# Patient Record
Sex: MALE | Race: WHITE | NOT HISPANIC OR LATINO | Employment: FULL TIME | URBAN - NONMETROPOLITAN AREA
[De-identification: names, ages, dates, MRNs, and addresses within clinical notes are randomized per-mention and may not be internally consistent; named-entity substitution may affect disease eponyms.]

---

## 2020-02-14 ENCOUNTER — OFFICE VISIT (OUTPATIENT)
Dept: FAMILY MEDICINE CLINIC | Facility: CLINIC | Age: 55
End: 2020-02-14

## 2020-02-14 VITALS
HEART RATE: 97 BPM | WEIGHT: 208.8 LBS | DIASTOLIC BLOOD PRESSURE: 62 MMHG | SYSTOLIC BLOOD PRESSURE: 120 MMHG | HEIGHT: 74 IN | BODY MASS INDEX: 26.8 KG/M2 | OXYGEN SATURATION: 98 %

## 2020-02-14 DIAGNOSIS — E11.65 TYPE 2 DIABETES MELLITUS WITH HYPERGLYCEMIA, WITHOUT LONG-TERM CURRENT USE OF INSULIN (HCC): ICD-10-CM

## 2020-02-14 DIAGNOSIS — Z76.89 ENCOUNTER TO ESTABLISH CARE: ICD-10-CM

## 2020-02-14 DIAGNOSIS — I48.91 ATRIAL FIBRILLATION, UNSPECIFIED TYPE (HCC): Primary | ICD-10-CM

## 2020-02-14 DIAGNOSIS — I21.4 NSTEMI (NON-ST ELEVATION MYOCARDIAL INFARCTION) (HCC): ICD-10-CM

## 2020-02-14 PROCEDURE — 99204 OFFICE O/P NEW MOD 45 MIN: CPT | Performed by: FAMILY MEDICINE

## 2020-02-14 RX ORDER — INSULIN GLARGINE 100 [IU]/ML
17 INJECTION, SOLUTION SUBCUTANEOUS DAILY
COMMUNITY
End: 2020-03-06

## 2020-02-15 NOTE — PROGRESS NOTES
Subjective   Chief Complaint   Patient presents with   • Establish Care   • Diabetes     Francis Whittaker is a 54 y.o. year old presenting to [facility] as new patient.      Additional Concerns:    Patient recently hospitalized from 2/9/2020 - 2/11/2020 after syncopal episode while working as a .   He was found to be in afib with RVR at admission, and noted to have an NSTEMI with elevated cardiac enzymes.  Cardioversion used to convert patient back to sinus rhythm.  He was discharged on metoprolol and Eliquis for this.  For the NSTEMI, patient will follow up with cardiology and cath will be scheduled once cardiac enzymes return to normal.    During hospitalization, patient was noted to have hyperglycemia.  New diagnosis of diabetes made.  He was started on insulin therapy.  Also had consultation with the diabetes educator.  Patient has been monitoring blood glucose 4 times daily since discharge.  Notes that it is normally in the low 200s, which is supported by the log he brings with him today.  His fasting range is between 210-227.  Slightly higher with readings before lunch, before dinner and at bedtime.  He is trying to eat around 60 g carbs/meal.  Wife counts her carbs, as she is non-insulin dependent diabetic.  His current insulin regimen is Lantus 15 units at bedtime and Humalog 5 units three times daily with meals.      Past Medical History:  Past Medical History:   Diagnosis Date   • Atrial fibrillation (CMS/HCC)    • Diabetes mellitus (CMS/HCC)        Past Surgical History:  No past surgical history on file.    Medications:  Current Outpatient Medications on File Prior to Visit   Medication Sig Dispense Refill   • apixaban (ELIQUIS) 5 MG tablet tablet Take 5 mg by mouth 2 (Two) Times a Day.     • insulin glargine (LANTUS) 100 UNIT/ML injection Inject 17 Units under the skin into the appropriate area as directed Daily.     • insulin lispro (humaLOG) 100 UNIT/ML injection Inject 5 Units under  "the skin into the appropriate area as directed 3 (Three) Times a Day Before Meals.     • metoprolol tartrate (LOPRESSOR) 25 MG tablet Take 12.5 mg by mouth 2 (Two) Times a Day.       No current facility-administered medications on file prior to visit.        Allergies:  Allergies no known allergies    Family History:  Family History   Problem Relation Age of Onset   • Aneurysm Father    • Diabetes Other    • Heart disease Other    • Hypertension Other        Social History:  Social History     Socioeconomic History   • Marital status:      Spouse name: Not on file   • Number of children: Not on file   • Years of education: Not on file   • Highest education level: Not on file   Tobacco Use   • Smoking status: Former Smoker     Years: 5.00   Substance and Sexual Activity   • Alcohol use: Not Currently     Health Maintenance   Topic Date Due   • TDAP/TD VACCINES (1 - Tdap) 04/15/1977   • ZOSTER VACCINE (1 of 2) 04/15/2016   • INFLUENZA VACCINE  08/01/2019   • COLONOSCOPY  02/14/2020       Problem list was reviewed and updated as appropriate.      Review of Systems   Constitutional: Negative for appetite change and unexpected weight change.   HENT: Negative for voice change.    Eyes: Negative for visual disturbance.   Respiratory: Negative for chest tightness and shortness of breath.    Cardiovascular: Negative for chest pain and leg swelling.   Gastrointestinal: Negative for abdominal pain, constipation and diarrhea.   Endocrine: Negative for cold intolerance and heat intolerance.   Genitourinary: Negative for difficulty urinating.   Musculoskeletal: Negative for back pain and myalgias.   Skin: Negative for rash.   Neurological: Negative for numbness and headaches.   Psychiatric/Behavioral: Negative for dysphoric mood and sleep disturbance.         Objective     /62 (BP Location: Left arm)   Pulse 97   Ht 188 cm (74\")   Wt 94.7 kg (208 lb 12.8 oz)   SpO2 98%   BMI 26.81 kg/m²   Physical Exam "   Constitutional: He is oriented to person, place, and time. He appears well-developed and well-nourished. No distress.   HENT:   Head: Normocephalic and atraumatic.   Right Ear: Tympanic membrane and ear canal normal.   Nose: Nose normal.   Mouth/Throat: Oropharynx is clear and moist.   Left ear cerumen impaction, unable to visualize the TM   Eyes: Pupils are equal, round, and reactive to light. Conjunctivae and EOM are normal.   Neck: Normal range of motion. Neck supple. No thyromegaly present.   Cardiovascular: Normal rate and regular rhythm.   Murmur heard.   Systolic murmur is present with a grade of 2/6.  Pulmonary/Chest: Effort normal and breath sounds normal. No respiratory distress. He has no wheezes. He has no rales.   Abdominal: Soft. Bowel sounds are normal. He exhibits no distension. There is no tenderness.   Musculoskeletal: Normal range of motion. He exhibits no edema or tenderness.   Lymphadenopathy:     He has no cervical adenopathy.   Neurological: He is alert and oriented to person, place, and time.   Skin: Skin is warm and dry. No rash noted.   Psychiatric: He has a normal mood and affect.         Lab Review   CBC, Hepatic function panel, CMP, PT/INR, TSH and HgbA1c from 2/9/2020 reviewed.     Imaging  None reviewed.         Assessment/Plan   Francis was seen today for establish care and diabetes.    Diagnoses and all orders for this visit:    Atrial fibrillation, unspecified type (CMS/HCC)  Patient in normal sinus rhythm today.  Should continue taking metoprolol and eliquis.  Has upcoming appointment with cardiology scheduled.  Patient is not having any symptoms at this time.    Type 2 diabetes mellitus with hyperglycemia, without long-term current use of insulin (CMS/HCC)  New diagnosis of diabetes.  Not well controlled yet.  HgbA1c was 11.4%.  Currently taking Lantus 15 units at bedtime and Humalog 5 units TID with meals.  Discussed that ways that different insulins work, and patient and his  wife voiced understanding.  Given that his fasting glucose is still elevated, will increase the Lantus to 17 units daily.  Should continue Humalog 5 units at this time.  Will increase conservatively at this time, as patient and his wife still have some uneasiness about using this medication.  Advised that if at any time during these dose adjustments his glucose is < 80, he should revert back to previous dosing and call.  He will continue to keep blood glucose log, checking 4 times daily.  He is watching his diet and practicing carb counting, which should make adjusting mealtime insulin easier.  Will monitor closely over the next few weeks.    NSTEMI (non-ST elevation myocardial infarction) (CMS/[facility])  Following with cardiology at this time.  Per patient, plan for heart cath after cardiac enzymes return to normal.  He should continue on metoprolol at this time.  Will need to consider addition of statin in the future as long as liver transaminases remain normal.    Encounter to establish care  Health maintenance items reviewed.  Recommendations for screening and immunizations discussed.  Patient has not been to a physician in many years.  Will plan to discuss further at future visits.            This document has been electronically signed by Caryn Titus MD on February 14, 2020 7:32 PM

## 2020-02-17 ENCOUNTER — TELEPHONE (OUTPATIENT)
Dept: FAMILY MEDICINE CLINIC | Facility: CLINIC | Age: 55
End: 2020-02-17

## 2020-02-17 NOTE — TELEPHONE ENCOUNTER
WIFE CALLED AND STATES THAT HE WAS EXPOSED TO THE FLU BUT HE WAS WEARING A MASK. SHE NEEDS TO KNOW WHAT ARE HIS CHANCES IF GETTING SICK AND WHAT THEY CAN DO TO KEEP FROM GETTING. CALL PATTI [phone]

## 2020-02-17 NOTE — TELEPHONE ENCOUNTER
Called and spoke to patient.  He notes that he was around his grandson one day, and the next day he was diagnosed with the flu.  He was wearing his mask.  Uncertain if washing his hands good.  He has another granddaughter with runny nose, but no flu symptoms.  Advised that patient should continue taking extra precautions to stay well, including his mask.  Should wash his hands frequently and avoid exposure to known sick individuals.  Discussed common flu symptoms, and advised that patient be seen right away for any of these.  He voiced understanding.  Arnaldo Titus

## 2020-02-21 ENCOUNTER — OFFICE VISIT (OUTPATIENT)
Dept: FAMILY MEDICINE CLINIC | Facility: CLINIC | Age: 55
End: 2020-02-21

## 2020-02-21 VITALS
WEIGHT: 210.9 LBS | SYSTOLIC BLOOD PRESSURE: 128 MMHG | HEART RATE: 97 BPM | HEIGHT: 74 IN | DIASTOLIC BLOOD PRESSURE: 82 MMHG | OXYGEN SATURATION: 99 % | BODY MASS INDEX: 27.07 KG/M2

## 2020-02-21 DIAGNOSIS — I48.91 ATRIAL FIBRILLATION, UNSPECIFIED TYPE (HCC): ICD-10-CM

## 2020-02-21 DIAGNOSIS — E11.65 TYPE 2 DIABETES MELLITUS WITH HYPERGLYCEMIA, WITHOUT LONG-TERM CURRENT USE OF INSULIN (HCC): Primary | ICD-10-CM

## 2020-02-21 LAB — GLUCOSE BLDC GLUCOMTR-MCNC: 174 MG/DL (ref 70–130)

## 2020-02-21 PROCEDURE — 82962 GLUCOSE BLOOD TEST: CPT | Performed by: FAMILY MEDICINE

## 2020-02-21 PROCEDURE — 99213 OFFICE O/P EST LOW 20 MIN: CPT | Performed by: FAMILY MEDICINE

## 2020-02-23 PROBLEM — I48.91 ATRIAL FIBRILLATION: Status: ACTIVE | Noted: 2020-02-23

## 2020-02-23 PROBLEM — E11.65 TYPE 2 DIABETES MELLITUS WITH HYPERGLYCEMIA, WITHOUT LONG-TERM CURRENT USE OF INSULIN: Status: ACTIVE | Noted: 2020-02-23

## 2020-02-24 NOTE — PROGRESS NOTES
Follow Up Office Visit          Francis Whittaker is a 54 y.o. male that presents today for   Chief Complaint   Patient presents with   • Follow-up   • Atrial Fibrillation   • Diabetes       Atrial Fibrillation   Presents for follow-up visit. Symptoms are negative for chest pain, dizziness, hypotension, palpitations, shortness of breath, syncope, tachycardia and weakness. The symptoms have been stable (Patient taking metoprolol without adverse effects). Past medical history includes atrial fibrillation.   Diabetes   He presents for his follow-up diabetic visit. He has type 2 diabetes mellitus. The initial diagnosis of diabetes was made 1 month ago. His disease course has been improving. Pertinent negatives for hypoglycemia include no confusion, dizziness or headaches. Pertinent negatives for diabetes include no blurred vision, no chest pain, no fatigue, no foot paresthesias, no visual change, no weakness and no weight loss. Symptoms are improving. There are no diabetic complications. Current diabetic treatment includes diet and insulin injections. He is compliant with treatment all of the time. He is currently taking insulin pre-breakfast, pre-lunch, pre-dinner and at bedtime. His weight is stable. He is following a diabetic (Counting Carbs, Eats about 60 carbs/meal) diet. He participates in exercise intermittently (short walks). His home blood glucose trend is decreasing steadily. (Breakfast 186-216, Lunch 138-260, Dinner 158-292, Bedtime 164-225)       The following portions of the patient's history were reviewed and updated as appropriate: allergies, current medications, past medical history, past surgical history and problem list.    Review of Systems   Constitutional: Negative for chills, fatigue and weight loss.   HENT: Negative for congestion and sore throat.    Eyes: Negative for blurred vision.   Respiratory: Negative for cough and shortness of breath.    Cardiovascular: Negative for chest pain, palpitations,  "leg swelling and syncope.   Gastrointestinal: Negative for abdominal pain, diarrhea, nausea and vomiting.   Genitourinary: Negative for difficulty urinating.   Musculoskeletal: Negative for back pain and myalgias.   Skin: Negative for rash.   Neurological: Negative for dizziness, weakness, numbness and headaches.   Psychiatric/Behavioral: Negative for confusion, dysphoric mood and sleep disturbance.           Vitals:    02/21/20 1354   BP: 128/82   BP Location: Left arm   Pulse: 97   SpO2: 99%   Weight: 95.7 kg (210 lb 14.4 oz)   Height: 188 cm (74\")     Body mass index is 27.08 kg/m².    Physical Exam   Constitutional: He is oriented to person, place, and time. He appears well-developed and well-nourished. No distress.   HENT:   Head: Normocephalic and atraumatic.   Mouth/Throat: Oropharynx is clear and moist.   Eyes: EOM are normal.   Neck: Neck supple.   Cardiovascular: Normal rate and regular rhythm.   No murmur heard.  Pulmonary/Chest: Effort normal and breath sounds normal. No respiratory distress. He has no wheezes.   Abdominal: Soft. There is no tenderness.   Musculoskeletal: He exhibits no edema.   Neurological: He is alert and oriented to person, place, and time.   Skin: Skin is warm and dry. No rash noted.   Psychiatric: He has a normal mood and affect.   Vitals reviewed.      Assessment/Plan       Francis was seen today for follow-up, atrial fibrillation and diabetes.    Diagnoses and all orders for this visit:    Type 2 diabetes mellitus with hyperglycemia, without long-term current use of insulin (CMS/[facility])  Patient's current insulin regimen includes Lantus 17 units at bedtime, and Humalog 5 units 3 times daily with meals.  Blood glucose levels are still consistently high.  Some inconsistencies between these.  Glucose readings with patient in the office.  Blood glucose checked with office meter, patient's glucometer and his wife's glucometer.  Patient and his wife's glucose meters read between 210-220 " when patient's glucose was checked.  Office meter reading was 174, which is 50 points below patient's meter.  Patient is uncomfortable making insulin adjustments until he is sure his meter is accurate.  Encouraged that patient have his glucose meter calibrated to be on the safe side, then call when this has been done and medication adjustments could be made if needed.  Should continue carbohydrate counting.  Will plan to get urine microalbumin and diabetic foot exam at next visit.  -     POCT Glucose    Atrial fibrillation, unspecified type (CMS/HCC)  Rate controlled at this time with metoprolol  Normal sinus rhythm at office visit today  Patient following with cardiology  Plan for cardiac catheterization in a couple of weeks  Patient is anxious about this exam            This document has been electronically signed by Caryn Titus MD

## 2020-03-05 ENCOUNTER — TELEPHONE (OUTPATIENT)
Dept: FAMILY MEDICINE CLINIC | Facility: CLINIC | Age: 55
End: 2020-03-05

## 2020-03-05 NOTE — TELEPHONE ENCOUNTER
Francis's wife is needing to speak with you regarding Francis's Insulin.  She thinks they would do better with something other than the needles.

## 2020-03-06 RX ORDER — INSULIN LISPRO 100 [IU]/ML
5 INJECTION, SOLUTION INTRAVENOUS; SUBCUTANEOUS
Qty: 4.5 ML | Refills: 2 | Status: SHIPPED | OUTPATIENT
Start: 2020-03-06 | End: 2020-05-20 | Stop reason: SDUPTHER

## 2020-03-06 NOTE — TELEPHONE ENCOUNTER
Called and spoke with patient's wife who notes that he need insulin refilled.  Had discussed changing from vials to pens at last visit due to visual problems with patient and his wife seeing the syringe.  Lantus and Humalog pens sent to the pharmacy.  Patient will call tonight to make sure they are able to pick these up.  Will plan to contact patient tomorrow to ensure he was able to get them and insurance didn't deny these medications.      Wife reports patient just left the office after his heart cath.  Found severe aortic stenosis and cardiomyopathy per patient's wife.   Plans to have replacement of aortic valve, consultation set up in [place], [place].       MARIELA [facility]

## 2020-03-07 ENCOUNTER — TELEPHONE (OUTPATIENT)
Dept: FAMILY MEDICINE CLINIC | Facility: CLINIC | Age: 55
End: 2020-03-07

## 2020-03-07 NOTE — TELEPHONE ENCOUNTER
Spoke with pharmacy.  Patient's Humalog is covered by insurance, however pens will not be available until Monday.  If patient needs before, prescription can be transferred to another pharmacy.  Lantus was changed to Basaglar per insurance, and this is available at pharmacy.  Pen needle prescription sent as well.  Spoke to patient's wife about these medications, and she voiced understanding.  Will call Monday with any additional questions/concerns.  Arnaldo Titus

## 2020-03-09 ENCOUNTER — TELEPHONE (OUTPATIENT)
Dept: FAMILY MEDICINE CLINIC | Facility: CLINIC | Age: 55
End: 2020-03-09

## 2020-03-09 NOTE — TELEPHONE ENCOUNTER
WIFE CALLED AND STATES HE NEEDS REFILLS SENT TO [facility]  metoprolol tartrate (LOPRESSOR) 25 MG tablet  apixaban (ELIQUIS) 5 MG tablet tablet

## 2020-03-16 ENCOUNTER — OFFICE VISIT (OUTPATIENT)
Dept: FAMILY MEDICINE CLINIC | Facility: CLINIC | Age: 55
End: 2020-03-16

## 2020-03-16 VITALS
HEIGHT: 74 IN | WEIGHT: 210 LBS | DIASTOLIC BLOOD PRESSURE: 88 MMHG | HEART RATE: 88 BPM | BODY MASS INDEX: 26.95 KG/M2 | OXYGEN SATURATION: 94 % | SYSTOLIC BLOOD PRESSURE: 126 MMHG

## 2020-03-16 DIAGNOSIS — E11.65 TYPE 2 DIABETES MELLITUS WITH HYPERGLYCEMIA, WITHOUT LONG-TERM CURRENT USE OF INSULIN (HCC): Primary | ICD-10-CM

## 2020-03-16 DIAGNOSIS — I35.0 AORTIC VALVE STENOSIS, ETIOLOGY OF CARDIAC VALVE DISEASE UNSPECIFIED: ICD-10-CM

## 2020-03-16 PROCEDURE — 99213 OFFICE O/P EST LOW 20 MIN: CPT | Performed by: FAMILY MEDICINE

## 2020-03-16 RX ORDER — BLOOD SUGAR DIAGNOSTIC
STRIP MISCELLANEOUS
COMMUNITY
Start: 2020-02-23 | End: 2020-03-16 | Stop reason: SDUPTHER

## 2020-03-16 RX ORDER — PEN NEEDLE, DIABETIC 30 GX3/16"
1 NEEDLE, DISPOSABLE MISCELLANEOUS
Qty: 100 EACH | Refills: 2 | Status: SHIPPED | OUTPATIENT
Start: 2020-03-16 | End: 2020-04-03 | Stop reason: SDUPTHER

## 2020-03-16 NOTE — PATIENT INSTRUCTIONS
Increase Basaglar (night time) to 19 units at bedtime    If in 1 week, Blood sugars are consistently > 160 during the day  Increase Humalog (day time) to 6 units at bedtime

## 2020-03-16 NOTE — PROGRESS NOTES
Follow Up Office Visit          Francis Whittaker is a 54 y.o. male that presents today for   Chief Complaint   Patient presents with   • Diabetes   • Atrial Fibrillation       HPI    Patient presents today for diabetes follow-up.  Notes that diabetes is doing okay.  He is having to urinate a lot less since getting his diabetes under control.  He is currently taking Basaglar 17 units at bedtime.  He is taking Humalog 5 units 3 times daily with meals.  Brings blood glucose log with him today.  Most blood sugars are somewhere in the range of 150-190.  No blood glucose levels less than 100.  Rarely a blood glucose level over 200.  Patient now has quick pens that allow him to more easily administer insulin himself.  Requesting refill of testing strips and insulin pen needles today.     Patient notes that cardiac catheterization found significant aortic valve stenosis.  Patient is being referred to a provider in [place] to discuss noninvasive options for correction/replacement.  He has that appointment scheduled at the beginning of next month.  Patient is following with local cardiologist in [place].    The following portions of the patient's history were reviewed and updated as appropriate: allergies, current medications, past medical history and problem list.    Review of Systems   Constitutional: Negative for chills and fatigue.   HENT: Negative for congestion and sore throat.    Respiratory: Negative for cough and shortness of breath.    Cardiovascular: Negative for chest pain and leg swelling.   Gastrointestinal: Negative for abdominal pain, diarrhea, nausea and vomiting.   Genitourinary: Negative for difficulty urinating.   Musculoskeletal: Negative for back pain and myalgias.   Skin: Negative for rash.   Neurological: Negative for numbness and headaches.   Psychiatric/Behavioral: Negative for dysphoric mood and sleep disturbance.           Vitals:    03/16/20 1325   BP: 126/88   Pulse: 88   SpO2: 94%   Weight:  "95.3 kg (210 lb)   Height: 188 cm (74\")     Body mass index is 26.96 kg/m².    Physical Exam   Constitutional: He is oriented to person, place, and time. He appears well-developed and well-nourished. No distress.   HENT:   Head: Normocephalic and atraumatic.   Mouth/Throat: Oropharynx is clear and moist.   Eyes: EOM are normal.   Neck: Neck supple.   Cardiovascular: Normal rate and regular rhythm.   No murmur heard.  Pulmonary/Chest: Effort normal and breath sounds normal. No respiratory distress. He has no wheezes.   Abdominal: Soft. There is no tenderness.   Musculoskeletal: He exhibits no edema.   Neurological: He is alert and oriented to person, place, and time.   Skin: Skin is warm and dry. No rash noted.   Psychiatric: He has a normal mood and affect.   Vitals reviewed.      Assessment/Plan       Francis was seen today for diabetes and atrial fibrillation.    Diagnoses and all orders for this visit:    Type 2 diabetes mellitus with hyperglycemia, without long-term current use of insulin (CMS/[facility])  Diabetes has shown significant improvement over the last month  Patient should continue checking blood glucose level 4 times daily  Patient should increase Basaglar to 19 units at bedtime  If blood glucose levels during the day are still greater than 160 after 1 week, patient should increase Humalog to 6 units 3 times daily with meals  If any blood glucose levels less than 100, patient should call and go back to previous dosing  Insulin pen needles and test strips refilled today  We will follow-up with phone call in 1 to 2 weeks  Next appointment in 3 months  Hemoglobin A1c ordered to be done prior to this visit.  -     Insulin Glargine (LANTUS SOLOSTAR) 100 UNIT/ML injection pen; Inject 19 Units under the skin into the appropriate area as directed Every Night for 90 days.  -     glucose blood (ACCU-CHEK GUIDE) test strip; 1 each by Other route 4 (Four) Times a Day Before Meals & at Bedtime. Use as instructed  -     " Insulin Pen Needle (PEN NEEDLES) 32G X 4 MM misc; 1 each 4 (Four) Times a Day Before Meals & at Bedtime.  -     Hemoglobin A1c; Future              This document has been electronically signed by Caryn Titus MD on March 16, 2020 13:37

## 2020-03-17 RX ORDER — INSULIN GLARGINE 100 [IU]/ML
19 INJECTION, SOLUTION SUBCUTANEOUS NIGHTLY
Qty: 2 PEN | Refills: 2 | Status: SHIPPED | OUTPATIENT
Start: 2020-03-17 | End: 2020-03-27 | Stop reason: SDUPTHER

## 2020-03-27 ENCOUNTER — TELEPHONE (OUTPATIENT)
Dept: FAMILY MEDICINE CLINIC | Facility: CLINIC | Age: 55
End: 2020-03-27

## 2020-03-27 DIAGNOSIS — E11.65 TYPE 2 DIABETES MELLITUS WITH HYPERGLYCEMIA, WITHOUT LONG-TERM CURRENT USE OF INSULIN (HCC): ICD-10-CM

## 2020-03-27 RX ORDER — INSULIN GLARGINE 100 [IU]/ML
19 INJECTION, SOLUTION SUBCUTANEOUS NIGHTLY
Qty: 2 PEN | Refills: 2 | Status: SHIPPED | OUTPATIENT
Start: 2020-03-27 | End: 2020-06-16

## 2020-03-27 RX ORDER — BLOOD SUGAR DIAGNOSTIC
STRIP MISCELLANEOUS
Qty: 100 EACH | Refills: 0 | Status: SHIPPED | OUTPATIENT
Start: 2020-03-27 | End: 2020-03-27 | Stop reason: SDUPTHER

## 2020-04-03 ENCOUNTER — TELEPHONE (OUTPATIENT)
Dept: FAMILY MEDICINE CLINIC | Facility: CLINIC | Age: 55
End: 2020-04-03

## 2020-04-03 DIAGNOSIS — E11.65 TYPE 2 DIABETES MELLITUS WITH HYPERGLYCEMIA, WITHOUT LONG-TERM CURRENT USE OF INSULIN (HCC): ICD-10-CM

## 2020-04-03 RX ORDER — PEN NEEDLE, DIABETIC 30 GX3/16"
1 NEEDLE, DISPOSABLE MISCELLANEOUS
Qty: 100 EACH | Refills: 2 | Status: SHIPPED | OUTPATIENT
Start: 2020-04-03 | End: 2020-06-16 | Stop reason: SDUPTHER

## 2020-04-03 NOTE — TELEPHONE ENCOUNTER
ALSO WIFE WANTS TO KNOW IF SHE RECOMMENDS HIM TO TAKE VITAMIN C. AND IF SO WHAT KIND OR CAN HE GET A RX?

## 2020-04-03 NOTE — TELEPHONE ENCOUNTER
Please let patient know that there is not currently any evidence to show benefit for the use of vitamin C in respect to the prevention of Coronavirus, as I assume this is the patient's concern.  However, taking an OTC vitamin C supplement would be unlikely to cause any harm if this is something that he would like to do.  I would recommend maintaining a healthy diet over the next few weeks, with fruits and vegetables that are high in vitamin C and other good nutrients (vitamins/minerals).  Please let me know if there are any additional questions/concerns.  Arnaldo Titus

## 2020-04-03 NOTE — TELEPHONE ENCOUNTER
PT STATES THAT [facility] IS SAYING THEY DID NOT GET THE RX FOR HIS Insulin Pen Needle (PEN NEEDLES) 32G X 4 MM [facility]

## 2020-04-30 ENCOUNTER — TELEMEDICINE (OUTPATIENT)
Dept: FAMILY MEDICINE CLINIC | Facility: CLINIC | Age: 55
End: 2020-04-30

## 2020-04-30 DIAGNOSIS — E11.65 TYPE 2 DIABETES MELLITUS WITH HYPERGLYCEMIA, WITHOUT LONG-TERM CURRENT USE OF INSULIN (HCC): Primary | ICD-10-CM

## 2020-04-30 PROCEDURE — 99213 OFFICE O/P EST LOW 20 MIN: CPT | Performed by: FAMILY MEDICINE

## 2020-04-30 NOTE — PROGRESS NOTES
Follow Up Office Visit          Francis Whittaker is a 55 y.o. male that presents today for   Chief Complaint   Patient presents with   • Diabetes     You have chosen to receive care through a telehealth visit.  Do you consent to use a video/audio connection for your medical care today? Yes    HPI    Patient is being seen for follow up diabetes.  Checking blood glucose 4 times daily.  Fasting blood sugar range is 135-192.  Mealtimes have been a little lower, with lowest reading being 94.  He felt a little shaky when blood sugar was this low.  He has had a few in the 90s before lunch and dinner.  Low 100s sometimes at bedtime.  When his blood sugars are this low, he is nervous about taking his insulin.  He has been waiting until after he eats at these times to take his rapid acting.  He also has been holding his long acting insulin occasionally due to this concern.      The following portions of the patient's history were reviewed and updated as appropriate: allergies, current medications, past medical history and problem list.    Review of Systems   Constitutional: Negative for chills and fatigue.   HENT: Negative for congestion and sore throat.    Respiratory: Negative for cough and shortness of breath.    Cardiovascular: Negative for chest pain and leg swelling.   Gastrointestinal: Negative for abdominal pain, diarrhea, nausea and vomiting.   Skin: Negative for rash.   Neurological: Negative for numbness and headaches.   Psychiatric/Behavioral: Negative for dysphoric mood and sleep disturbance.         Physical Exam    Assessment/Plan       Francis was seen today for diabetes.    Diagnoses and all orders for this visit:    Type 2 diabetes mellitus with hyperglycemia, without long-term current use of insulin (CMS/[facility])    Overall well controlled  No blood sugar readings < 80  Some symptoms of hypoglycemia  Discussed with patient that it is important to take normal rapid acting insulin dose only when he is going to eat a  meal.  Patient should keep long acting insulin dose at 19 units  Only hold bedtime insulin if glucose is <100  Decrease Humalog to 5 units with meals  Patient feels uncomfortable taking Humalog when glucose <120  Will add sliding scale insulin to be used after meal if he does not take normal dose  Sliding scale is as follows (and patient's wife wrote this down):   150-200 : 1 unit   200-250:  2 units   250-300:  3 units   300-350:  4 units   > 350:      5 units  Should continue checking blood glucose 4 times daily, or as needed for hypoglycemic symptoms  Blood sugar goal is  for this patient, and he was informed  Will allow to be slightly higher while patient is getting comfortable with new diagnosis/insulin  HgbA1c lab order placed, and patient will have this done prior to next appointment  Follow up visit in 3 weeks      Spent a total of 23 minutes conducting video visit and chart completion for this encounter.        This document has been electronically signed by Caryn Titus MD on April 30, 2020 10:11

## 2020-05-19 ENCOUNTER — LAB (OUTPATIENT)
Dept: LAB | Facility: HOSPITAL | Age: 55
End: 2020-05-19

## 2020-05-19 DIAGNOSIS — E11.65 TYPE 2 DIABETES MELLITUS WITH HYPERGLYCEMIA, WITHOUT LONG-TERM CURRENT USE OF INSULIN (HCC): ICD-10-CM

## 2020-05-19 LAB — HBA1C MFR BLD: 6.7 % (ref 4.8–5.6)

## 2020-05-19 PROCEDURE — 83036 HEMOGLOBIN GLYCOSYLATED A1C: CPT

## 2020-05-19 PROCEDURE — 36415 COLL VENOUS BLD VENIPUNCTURE: CPT

## 2020-05-20 ENCOUNTER — TELEPHONE (OUTPATIENT)
Dept: FAMILY MEDICINE CLINIC | Facility: CLINIC | Age: 55
End: 2020-05-20

## 2020-05-20 RX ORDER — INSULIN LISPRO 100 [IU]/ML
5 INJECTION, SOLUTION INTRAVENOUS; SUBCUTANEOUS
Qty: 4.5 ML | Refills: 2 | Status: SHIPPED | OUTPATIENT
Start: 2020-05-20 | End: 2020-05-22 | Stop reason: SDUPTHER

## 2020-05-20 NOTE — TELEPHONE ENCOUNTER
Called and spoke to patients wife, Indira. She will relay msg to patient. I asked her if he would rather do a video visit for tomorrow and she said she would ask him and get back with me. I've changed to doxi video visit at this time.

## 2020-05-20 NOTE — TELEPHONE ENCOUNTER
Please call and let patient know that his HgbA1c is 6.7% which is good.  Goal is <7%.  His hemoglobin A1c in 02/2020 was 11.3% for reference.  Can continue current medications and follow up at scheduled appointment tomorrow.  ThanksMARIELA

## 2020-05-21 ENCOUNTER — TELEMEDICINE (OUTPATIENT)
Dept: FAMILY MEDICINE CLINIC | Facility: CLINIC | Age: 55
End: 2020-05-21

## 2020-05-21 DIAGNOSIS — E11.65 TYPE 2 DIABETES MELLITUS WITH HYPERGLYCEMIA, WITHOUT LONG-TERM CURRENT USE OF INSULIN (HCC): Primary | ICD-10-CM

## 2020-05-21 DIAGNOSIS — I35.0 SEVERE AORTIC STENOSIS: ICD-10-CM

## 2020-05-21 DIAGNOSIS — F41.9 ANXIETY: ICD-10-CM

## 2020-05-21 PROCEDURE — 99214 OFFICE O/P EST MOD 30 MIN: CPT | Performed by: FAMILY MEDICINE

## 2020-05-21 NOTE — PATIENT INSTRUCTIONS
Transcatheter Aortic Valve Replacement                        Transcatheter aortic valve replacement (TAVR) is a procedure to place an artificial aortic valve in the heart. The aortic valve controls blood flow between the heart and the main blood vessel that supplies blood to the body (aorta). During TAVR, a flexible tube (catheter) is placed through an incision in the groin, chest, or neck and is guided to the aortic valve. Then, the artificial valve is moved through the catheter and into the aortic valve, where it is expanded. The new valve takes over the function of the old valve, and it improves blood flow through the heart.  This procedure is also called transcatheter aortic valve implantation (DUSTY). You may need TAVR if you have a stiff aortic valve (aortic stenosis) that is causing symptoms and open heart valve replacement surgery is not an option for you.    Tell a health care provider about:  · Any allergies you have.  · All medicines you are taking, including vitamins, herbs, eye drops, creams, and over-the-counter medicines.  · Any problems you or family members have had with anesthetic medicines.  · Any blood disorders you have.  · Any surgeries you have had.  · Any medical conditions you have.  · Whether you are pregnant or may be pregnant.    What are the risks?  Generally, this is a safe procedure. However, problems may occur, including:  · Bleeding.  · Damage to blood vessels or the heart.  · Stroke.  · Blood clots.  · Abnormal heart rhythms (arrhythmia).  · Heart attack.  · Allergic reactions to medicines or dyes.  · Infection.  · Kidney failure.  · Failure of the artificial valve.    What happens before the procedure?  Medicines  · Ask your health care provider about:  ? Changing or stopping your regular medicines. This is especially important if you are taking diabetes medicines or blood thinners.  ? Taking medicines such as aspirin and ibuprofen. These medicines can thin your blood. Do not take  these medicines before your procedure if your health care provider instructs you not to.  · You may be given antibiotic medicine to help prevent infection.  · You may need to take blood thinners before surgery. If so, take these medicines as directed.    Staying hydrated  Follow instructions from your health care provider about hydration, which may include:  · Up to 2 hours before the procedure - you may continue to drink clear liquids, such as water, clear fruit juice, black coffee, and plain tea.    Eating and drinking restrictions  Follow instructions from your health care provider about eating and drinking, which may include:  · 8 hours before the procedure - stop eating heavy meals or foods such as meat, fried foods, or fatty foods.  · 6 hours before the procedure - stop eating light meals or foods, such as toast or cereal.  · 6 hours before the procedure - stop drinking milk or drinks that contain milk.  · 2 hours before the procedure - stop drinking clear liquids.    General instructions  · Do not use any products that contain nicotine or tobacco for as long as possible before your procedure. These include cigarettes and e-cigarettes. If you need help quitting, ask your health care provider.  · You may have a blood or urine sample taken.  · You may have tests, such as:  ? Echocardiogram. This is an ultrasound scan of the heart.  ? CT scan.  ? Cardiac catheterization. During this procedure, a catheter is inserted into a blood vessel and guided into your heart to check pressures and take images of the heart.  ? Lung function tests.  · Plan to have someone take you home from the hospital.    What happens during the procedure?  · To lower your risk of infection:  ? Your health care team will wash or sanitize their hands.  ? Your skin will be washed with soap.  · IV tubes will be inserted into veins in your arms.  · You will be given one or more of the following:  ? A medicine to help you relax (sedative).  ? A  medicine to numb the area (local anesthetic).  ? A medicine to make you fall asleep (general anesthetic).  · Small incisions will be made in one or more of the following places:  ? Your groin. This is the most common.  ? Your neck.  ? In your chest, over your breastbone (sternum).  ? The left side of your chest.  · Catheters will be threaded into your incisions and into blood vessels that lead to your heart. If your incision is in the left side of your chest, a catheter will be placed directly into your heart. Catheters will be used to monitor your heartbeat and regulate it if necessary (pacemaker).  · Ongoing X-ray images (fluoroscopy) will be used to guide the catheters to the right places in your heart.  · A wire may be placed through the catheter inside your aortic valve. A balloon at the end of this catheter may be inflated to open your aortic valve. This wire will then be removed.  · Another wire will be placed through the catheter inside your aortic valve. The wire will be used to help position and inflate your artificial valve. Then, the wire will be removed.  · Tests will be done to make sure your new valve is working and your heart is functioning properly.  · Your incisions will be closed with stitches (sutures), skin glue, or adhesive strips.  · If you had an incision in your chest wall, you may have a chest tube placed to keep your lung from collapsing.  · Bandages (dressings) will be placed over your incisions.  The procedure may vary among health care providers and hospitals.    What happens after the procedure?  · Your blood pressure, heart rate, breathing rate, and blood oxygen level will be monitored.  · You may have to wear compression stockings. These stockings help to prevent blood clots and reduce swelling in your legs.  · You may need to lie still in bed for several hours. Once you are allowed to get up, you will be encouraged to move around.  · If you had a chest incision, you will have some  chest pain. You will be given pain medicine as needed.  · You may continue to receive fluids and medicines through an IV tube.  · You will be given blood thinners.  · You will be shown how to do breathing exercises to prevent lung infection.  · You may continue to have a chest tube draining fluid from the surgical area.  · Do not drive for 24 hours if you were given a sedative.    Summary  · Transcatheter aortic valve replacement (TAVR) is a procedure to place an artificial aortic valve in the heart.  · You may need TAVR if you have a stiff aortic valve (aortic stenosis) that is causing symptoms and open heart valve replacement surgery is not an option for you.  · After the procedure, you may need to lie still in bed for several hours. Once you are allowed to get up, you will be encouraged to move around.  This information is not intended to replace advice given to you by your health care provider. Make sure you discuss any questions you have with your health care provider.  Document Released: 11/06/2017 Document Revised: 11/06/2017 Document Reviewed: 11/06/2017  [employer] Interactive Patient Education © 2020 [employer]

## 2020-05-21 NOTE — PROGRESS NOTES
Follow Up Office Visit          Francis Whittaker is a 55 y.o. male that presents today for   Chief Complaint   Patient presents with   • Diabetes     You have chosen to receive care through a telehealth visit.  Do you consent to use a video/audio connection for your medical care today? Yes    HPI    Patient seen today for video visit with his wife present.   Follow-up on diabetes.  Patient notes her blood sugars are doing overall pretty good.  Current insulin regimen is Basaglar 19 units at bedtime and Humalog 5 units with meals.  Patient does have sliding scale insulin, and has been using these corrections about 1-2 times per week.  Blood sugar readings over the last week are listed below:    Fasting 157, 159, 156, 160, 143, 146   Before lunch 112, 132, 164, 113, 99, 167  Before dinner 152, 161, 161, 145, 125, 168   Before bed 152, 160, 161, 161, 145, 125    Patient has not had any hypoglycemic symptoms or episodes.  He states that since his diabetes has been diagnosed and under control, he feels good every day.    Patient is struggling with some anxiety.  Having some flashbacks from the events surrounding his syncope and NSTEMI, for which she was hospitalized a few months ago.  He has continued cardiology follow-up, and most recently had an echocardiogram done.  He is awaiting a phone call to schedule appointment with cardiac surgeon in [place] for consultation regarding possible TAVR for severe aortic stenosis.  Patient has a lot of anxiety surrounding this consultation/procedure.  He notes that if he stays busy he is fine, however he is nervous most of the time.  Wife reports that he gets such bad anxiety, that he sometimes has to walk away when discussing his medical care.  She also notes that he has some crying spells.  Patient does not want to be on any medications for anxiety or depression at this time.      The following portions of the patient's history were reviewed and updated as appropriate: allergies,  current medications, past medical history and problem list.    Review of Systems   Constitutional: Negative for chills and fatigue.   HENT: Negative for congestion and sore throat.    Respiratory: Negative for cough and shortness of breath.    Cardiovascular: Negative for chest pain and leg swelling.   Gastrointestinal: Negative for abdominal pain, diarrhea, nausea and vomiting.   Musculoskeletal: Negative for back pain and myalgias.   Skin: Negative for rash.   Neurological: Negative for numbness and headaches.   Psychiatric/Behavioral: Negative for dysphoric mood and sleep disturbance. The patient is nervous/anxious.            Physical Exam   Constitutional: He is oriented to person, place, and time. He appears well-developed and well-nourished. No distress.   HENT:   Head: Normocephalic and atraumatic.   Eyes: EOM are normal.   Pulmonary/Chest: Effort normal. No respiratory distress.   Neurological: He is alert and oriented to person, place, and time.   Psychiatric: His mood appears anxious.     Glucose 157 this morning       Assessment/Plan           Francis was seen today for diabetes.    Diagnoses and all orders for this visit:    Type 2 diabetes mellitus with hyperglycemia, without long-term current use of insulin (CMS/[facility])  Diabetes mellitus fairly well controlled  Dramatic improvement of hemoglobin A1c, last lab done 5/19/2020 was 6.7%  Based on blood sugar readings, will make slight adjustments  Continue Basaglar 19 units at bedtime  Humalog, 5 units with breakfast and 6 units with lunch and dinner  Patient can continue using sliding scale insulin, but should start with 200 as follows:    200-250:  2 units              250-300:  3 units              300-350:  4 units              > 350:      5 units  Continue checking blood glucose 4 times daily  Blood sugar goal is   Patient should call with any concerns prior to next appointment    Severe aortic stenosis  Following with cardiologist in  [place].  Awaiting scheduling for consultation with cardiac surgeon to discuss TAVR  Will mail patient some information about this procedure, however this information is not comprehensive and patient will likely still have questions that need to be addressed by the surgeon  Patient is asymptomatic at this time    Anxiety  Long discussion about patient's anxiety, especially regarding potential cardiac procedure  Discussed options for treatment with this patient  Since he is hesitant about starting medications, recommended that we consider a PRN medication for anxiety like hydroxyzine  Discussed that we could start with a very low dose, and patient would only take if he tolerated and it helped with symptoms  Patient is not ready and does not wish to start medication at this time, but he will consider it      Spent a total of 30 minutes conducting video visit and chart completion for this encounter.              This document has been electronically signed by Caryn Titus MD on May 21, 2020 10:49

## 2020-05-22 DIAGNOSIS — E11.65 TYPE 2 DIABETES MELLITUS WITH HYPERGLYCEMIA, WITHOUT LONG-TERM CURRENT USE OF INSULIN (HCC): Primary | ICD-10-CM

## 2020-05-22 RX ORDER — INSULIN LISPRO 100 [IU]/ML
INJECTION, SOLUTION INTRAVENOUS; SUBCUTANEOUS
Qty: 15 PEN | Refills: 1 | Status: SHIPPED | OUTPATIENT
Start: 2020-05-22 | End: 2020-06-16 | Stop reason: SDUPTHER

## 2020-06-02 ENCOUNTER — TELEPHONE (OUTPATIENT)
Dept: FAMILY MEDICINE CLINIC | Facility: CLINIC | Age: 55
End: 2020-06-02

## 2020-06-02 NOTE — TELEPHONE ENCOUNTER
Indira called requesting 2 meds that are not on patients list, she said one is his heart medication and the other is his blood thinner but she could not tell me the names, she said just tell Pastora to call me.

## 2020-06-05 ENCOUNTER — TELEPHONE (OUTPATIENT)
Dept: FAMILY MEDICINE CLINIC | Facility: CLINIC | Age: 55
End: 2020-06-05

## 2020-06-05 RX ORDER — BUSPIRONE HYDROCHLORIDE 5 MG/1
5 TABLET ORAL 2 TIMES DAILY
Qty: 60 TABLET | Refills: 0 | Status: SHIPPED | OUTPATIENT
Start: 2020-06-05 | End: 2020-09-24 | Stop reason: SDUPTHER

## 2020-06-05 NOTE — TELEPHONE ENCOUNTER
Wife states that discussion was had in regards to anxiety and medication when patient had video visit. Is requesting call back to discuss further concerns about medication.

## 2020-06-05 NOTE — TELEPHONE ENCOUNTER
Patient would like to consider medication.  Prescription sent to the pharmacy today, Buspar 5 mg BID.  Patient will think about it, and decide whether or not he wants to take it.  If he decides to take it, he should titrate up slowly to make sure that he tolerates.  They voiced understanding.  Will call with any problems.  Arnaldo Titus

## 2020-06-05 NOTE — TELEPHONE ENCOUNTER
Spoke with patient's wife.  She notes that she and her  have discussed his anxiety, and they believe he needs some medication.  His anxiety is starting to impact his daily life, even with family.  Wife and several family members take Buspar, and she thinks that he may benefit from this medication too.  Patient not available right now.  Advised that I would call back late this afternoon to discuss with patient and make sure he is ok with starting with medication.  Arnaldo Titus

## 2020-06-08 DIAGNOSIS — E11.65 TYPE 2 DIABETES MELLITUS WITH HYPERGLYCEMIA, WITHOUT LONG-TERM CURRENT USE OF INSULIN (HCC): ICD-10-CM

## 2020-06-08 NOTE — TELEPHONE ENCOUNTER
Called in to request RX refill of glucose blood (Accu-Chek Guide) test strip    Pharmacy confirmed - [id number] - [place], [place] - [address] [place] [address] AT [facility] & [facility] - [phone]  - [phone]   [phone]    Please Advise

## 2020-06-16 ENCOUNTER — OFFICE VISIT (OUTPATIENT)
Dept: FAMILY MEDICINE CLINIC | Facility: CLINIC | Age: 55
End: 2020-06-16

## 2020-06-16 VITALS
HEART RATE: 92 BPM | BODY MASS INDEX: 28.62 KG/M2 | OXYGEN SATURATION: 98 % | WEIGHT: 223 LBS | SYSTOLIC BLOOD PRESSURE: 130 MMHG | HEIGHT: 74 IN | DIASTOLIC BLOOD PRESSURE: 88 MMHG

## 2020-06-16 DIAGNOSIS — F41.9 ANXIETY: ICD-10-CM

## 2020-06-16 DIAGNOSIS — I35.0 SEVERE AORTIC STENOSIS: ICD-10-CM

## 2020-06-16 DIAGNOSIS — E11.65 TYPE 2 DIABETES MELLITUS WITH HYPERGLYCEMIA, WITHOUT LONG-TERM CURRENT USE OF INSULIN (HCC): Primary | ICD-10-CM

## 2020-06-16 PROCEDURE — 99213 OFFICE O/P EST LOW 20 MIN: CPT | Performed by: FAMILY MEDICINE

## 2020-06-16 RX ORDER — INSULIN GLARGINE 100 [IU]/ML
20-22 INJECTION, SOLUTION SUBCUTANEOUS NIGHTLY
Qty: 3 PEN | Refills: 2 | Status: SHIPPED | OUTPATIENT
Start: 2020-06-16 | End: 2020-06-16 | Stop reason: SDUPTHER

## 2020-06-16 RX ORDER — INSULIN LISPRO 100 [IU]/ML
INJECTION, SOLUTION INTRAVENOUS; SUBCUTANEOUS
Qty: 15 PEN | Refills: 1 | Status: SHIPPED | OUTPATIENT
Start: 2020-06-16 | End: 2020-07-23 | Stop reason: SDUPTHER

## 2020-06-16 RX ORDER — INSULIN GLARGINE 100 [IU]/ML
20-22 INJECTION, SOLUTION SUBCUTANEOUS NIGHTLY
Qty: 3 PEN | Refills: 2 | Status: SHIPPED | OUTPATIENT
Start: 2020-06-16 | End: 2020-07-23 | Stop reason: SDUPTHER

## 2020-06-16 RX ORDER — INSULIN LISPRO 100 [IU]/ML
INJECTION, SOLUTION INTRAVENOUS; SUBCUTANEOUS
Qty: 15 PEN | Refills: 1 | Status: SHIPPED | OUTPATIENT
Start: 2020-06-16 | End: 2020-06-16

## 2020-06-16 RX ORDER — PEN NEEDLE, DIABETIC 30 GX3/16"
1 NEEDLE, DISPOSABLE MISCELLANEOUS
Qty: 100 EACH | Refills: 2 | Status: SHIPPED | OUTPATIENT
Start: 2020-06-16 | End: 2020-06-16

## 2020-06-16 RX ORDER — PEN NEEDLE, DIABETIC 30 GX3/16"
1 NEEDLE, DISPOSABLE MISCELLANEOUS
Qty: 100 EACH | Refills: 2 | Status: SHIPPED | OUTPATIENT
Start: 2020-06-16 | End: 2020-09-01 | Stop reason: SDUPTHER

## 2020-06-16 RX ORDER — INSULIN GLARGINE 100 [IU]/ML
20-22 INJECTION, SOLUTION SUBCUTANEOUS NIGHTLY
Qty: 3 PEN | Refills: 2 | Status: SHIPPED | OUTPATIENT
Start: 2020-06-16 | End: 2020-06-16

## 2020-06-22 NOTE — PROGRESS NOTES
Follow Up Office Visit          Francis Whittaker is a 55 y.o. male that presents today for   Chief Complaint   Patient presents with   • Follow-up     3 month       HPI    Patient seen today for follow up diabetes mellitus.  He is doing well on current insulin regimen.  He is checking glucose levels 4 times daily.  His fasting glucose is usually around 170s.  He has been having some a little more elevated than that when he eats something with sugar in the evenings.  Patient is taking Basaglar 19 units at bedtime.  His mealtimes glucose is pretty similar to fasting.  He is taking Humalog 5 units with breakfast and 6 units with lunch and dinner.  He has not had any hypoglycemia.      Patient is having some difficulties with anxiety.  This was discussed over the telephone about a week ago.  He is getting very anxious/stressed about the valve abnormality in his heart, and the thought of the heart procedure is causing him significant distress.  Wife says that he often has to walk away anytime his medical condition is being discussed, as he gets too overwhelmed.  Patient was given prescription for low dose of Buspar to take as needed.  He has not tried this medication yet, and is not sure if he will.  He had a sister that passed away from overdose on anxiety/depression medication and this has always made him nervous to take anything like this.      The following portions of the patient's history were reviewed and updated as appropriate: allergies, current medications, past medical history and problem list.    Review of Systems   Constitutional: Negative for chills and fatigue.   HENT: Negative for congestion and sore throat.    Respiratory: Negative for cough and shortness of breath.    Cardiovascular: Negative for chest pain and leg swelling.   Gastrointestinal: Negative for abdominal pain, diarrhea, nausea and vomiting.   Genitourinary: Negative for difficulty urinating.   Musculoskeletal: Negative for back pain and  "myalgias.   Skin: Negative for rash.   Neurological: Negative for numbness and headaches.   Psychiatric/Behavioral: Positive for dysphoric mood. Negative for sleep disturbance. The patient is nervous/anxious.            Vitals:    06/16/20 1358   BP: 130/88   Pulse: 92   SpO2: 98%   Weight: 101 kg (223 lb)   Height: 188 cm (74\")     Body mass index is 28.63 kg/m².    Physical Exam   Constitutional: He is oriented to person, place, and time. He appears well-developed and well-nourished. No distress.   HENT:   Head: Normocephalic and atraumatic.   Mouth/Throat: Oropharynx is clear and moist.   Eyes: EOM are normal.   Neck: Neck supple.   Cardiovascular: Normal rate and regular rhythm.   Murmur heard.   Systolic murmur is present with a grade of 2/6.  Pulmonary/Chest: Effort normal and breath sounds normal. No respiratory distress. He has no wheezes.   Abdominal: Soft. There is no tenderness.   Musculoskeletal: He exhibits no edema.   Neurological: He is alert and oriented to person, place, and time.   Skin: Skin is warm and dry. No rash noted.   Psychiatric: He has a normal mood and affect.   Vitals reviewed.      Assessment/Plan   Francis was seen today for follow-up.    Diagnoses and all orders for this visit:    Type 2 diabetes mellitus with hyperglycemia, without long-term current use of insulin (CMS/[facility])  Patient still having elevated fasting glucose  Will gradually increase Basaglar to make this level normal  Will increase to 20 units at this time  Patient advised that every 4-5 days if fasting is still >130 he can adjust up 1 unit as needed, up to 22 units  Will keep Humalog regimen the same, 5 units with breakfast and 6 units with lunch and dinner  Refill of insulin and supplies provided  Keep log of blood glucose levels and bring to next appointment  -     glucose blood (Accu-Chek Guide) test strip; 1 each by Other route 4 (Four) Times a Day for 75 days. Use as instructed  -     Insulin Glargine (BASAGLAR " KWIKPEN) 100 UNIT/ML injection pen; Inject 20-22 Units under the skin into the appropriate area as directed Every Night for 90 days.  -     Insulin Lispro, 1 Unit Dial, (HumaLOG KwikPen) 100 UNIT/ML solution pen-injector; Inject 5 Units under the skin into the appropriate area as directed Daily With Breakfast AND 6 Units Daily With Lunch & Dinner. Do all this for 90 days.  -     Insulin Pen Needle (PEN NEEDLES) 32G X 4 MM misc; 1 each 4 (Four) Times a Day Before Meals & at Bedtime.      Anxiety  Patient still having anxiety symptoms  Discussed nonpharmacologic and pharmacologic treatment options  Buspar discussed again, and patient has medication at home if he feels like he needs it    Severe Aortic Stenosis  Patient has upcoming consultation appointment with surgeon to discuss TAVR in [place], [place] - this is going to be a virtual visit    Other orders  -     apixaban (ELIQUIS) 5 MG tablet tablet; Take 1 tablet by mouth 2 (Two) Times a Day for 31 days.  -     metoprolol tartrate (LOPRESSOR) 25 MG tablet; Take 0.5 tablets by mouth 2 (Two) Times a Day for 31 days.      Return in about 4 weeks (around 7/14/2020) for Recheck.          This document has been electronically signed by Caryn Titus MD on June 22, 2020 00:18

## 2020-07-06 ENCOUNTER — TELEPHONE (OUTPATIENT)
Dept: FAMILY MEDICINE CLINIC | Facility: CLINIC | Age: 55
End: 2020-07-06

## 2020-07-06 NOTE — TELEPHONE ENCOUNTER
Caller: JAIMEPATTI COLLINS    Relationship to patient: Emergency Contact    Best call back number: [phone]    Patient is needing: GLUCOSE READINGS:    - Tuesday 06/16/2020- BREAKFAST: 135, LUNCH: 171, DINNER: 131, BEDTIME: 175    - Wednesday 06/17/2020- BREAKFAST: 151, LUNCH: 239, DINNER: 113, BEDTIME: 192    - Thursday 06/18/2020- BREAKFAST: 187, LUNCH: 219 , DINNER: 130,  BEDTIME: 176    - Friday 06/19/2020- DINNER: 138, BEDTIME: 214    - Saturday 06/20/2020- BREAKFAST: 159, LUNCH: 140, DINNER: 105, BEDTIME: 218    - Sunday 06/21/2020- BREAKFAST: 149, LUNCH: 151, DINNER: 148, BEDTIME: 140    - Monday 06/22/2020- BREAKFAST: 139, LUNCH: 232, DINNER: 97, BEDTIME: 183    - Tuesday 06/23/2020- BREAKFAST: 147, LUNCH: 202, DINNER: 164, BEDTIME: 162    - Wednesday 06/24/2020- BREAKFAST: 154, LUNCH: 169, DINNER: 105, BEDTIME: 134    - Thursday 06/25/2020- BREAKFAST: 135, DINNER: 139, BEDTIME: 201    - Friday 06/26/2020- BREAKFAST:142, LUNCH: 155, DINNER: 113, BEDTIME: 229 (ICE CREAM)    - Saturday 06/27/2020- BREAKFAST: 129, LUNCH: 153, DINNER: 154, BEDTIME: 183    - Sunday 06/28/2020- BREAKFAST: 151, LUNCH: 153, DINNER: 202, BEDTIME: 164 (POPCORN)    - Monday 06/29/2020- BREAKFAST: 171, DINNER: 104,  BEDTIME: 164    - Tuesday 06/30/2020- BREAKFAST: 173, LUNCH: 167, DINNER: 123, BEDTIME: 201    - Wednesday 07/01/2020- BREAKFAST: 168, LUNCH: 187, DINNER: 124, BEDTIME: 163    - Thursday 07/02/2020- BREAKFAST: 174, LUNCH: 162, DINNER: 115, BEDTIME: 140    - Friday 07/03/2020- BREAKFAST: 170, LUNCH: 163, DINNER: 124, BEDTIME: 205    - Saturday 07/04/2020- BREAKFAST: 183, DINNER: 179, BEDTIME: 190    - Sunday 07/05/2020- BREAKFAST: 191, LUNCH: 212, DINNER: 241, BEDTIME: 193

## 2020-07-06 NOTE — TELEPHONE ENCOUNTER
Please call and ask that patient change insulin doses as follows based on home glucose readings:    - Take Humalog 6 units with breakfast, 6 units with lunch and 7 units with dinner    - Increase Basaglar to 21 units at bedtime    Patient should return back to previous regimen if any lows, although I do not expect that these minimal changes with cause this issue.      Thanks, MARIELA Titus

## 2020-07-23 ENCOUNTER — OFFICE VISIT (OUTPATIENT)
Dept: FAMILY MEDICINE CLINIC | Facility: CLINIC | Age: 55
End: 2020-07-23

## 2020-07-23 VITALS
DIASTOLIC BLOOD PRESSURE: 90 MMHG | HEART RATE: 88 BPM | WEIGHT: 224 LBS | OXYGEN SATURATION: 99 % | SYSTOLIC BLOOD PRESSURE: 120 MMHG | HEIGHT: 74 IN | BODY MASS INDEX: 28.75 KG/M2

## 2020-07-23 DIAGNOSIS — E11.65 TYPE 2 DIABETES MELLITUS WITH HYPERGLYCEMIA, WITHOUT LONG-TERM CURRENT USE OF INSULIN (HCC): Primary | ICD-10-CM

## 2020-07-23 PROCEDURE — 99213 OFFICE O/P EST LOW 20 MIN: CPT | Performed by: FAMILY MEDICINE

## 2020-07-23 RX ORDER — INSULIN LISPRO 100 [IU]/ML
INJECTION, SOLUTION INTRAVENOUS; SUBCUTANEOUS
Qty: 15 PEN | Refills: 1 | Status: SHIPPED | OUTPATIENT
Start: 2020-07-23 | End: 2020-10-21

## 2020-07-23 RX ORDER — INSULIN GLARGINE 100 [IU]/ML
22 INJECTION, SOLUTION SUBCUTANEOUS NIGHTLY
Qty: 3 PEN | Refills: 2 | Status: SHIPPED | OUTPATIENT
Start: 2020-07-23 | End: 2020-10-21

## 2020-08-17 ENCOUNTER — TELEPHONE (OUTPATIENT)
Dept: FAMILY MEDICINE CLINIC | Facility: CLINIC | Age: 55
End: 2020-08-17

## 2020-08-17 NOTE — TELEPHONE ENCOUNTER
PATIENTS WIFE CALLING TO ADVISE OF PAITENT GLUCOSE LEVELS ALSO STATES IS HAVING ISSUES WITH BLOOD SUGAR MONITOR

## 2020-08-17 NOTE — TELEPHONE ENCOUNTER
Please find out what levels are running for the past week - fasting, lunch, dinner and bedtime.  Also find out what is going on with his monitor.  ThanksMARIELA

## 2020-08-18 NOTE — TELEPHONE ENCOUNTER
Called and spoke with patient about blood glucose readings.  Advised that the following medication changes were made:    Increase Basaglar to 24 units at bedtime  Humalog 8 units with breakfast, 6 units with lunch, 7 units with dinner    Keep blood glucose log, and bring to appointment on Friday.  Patient voiced understanding, and will relay the information to his wife who helps with managing his diabetes.  Thanks, Andrea

## 2020-08-21 ENCOUNTER — LAB (OUTPATIENT)
Dept: LAB | Facility: HOSPITAL | Age: 55
End: 2020-08-21

## 2020-08-21 ENCOUNTER — OFFICE VISIT (OUTPATIENT)
Dept: FAMILY MEDICINE CLINIC | Facility: CLINIC | Age: 55
End: 2020-08-21

## 2020-08-21 VITALS
DIASTOLIC BLOOD PRESSURE: 86 MMHG | SYSTOLIC BLOOD PRESSURE: 106 MMHG | BODY MASS INDEX: 28.75 KG/M2 | WEIGHT: 224 LBS | OXYGEN SATURATION: 96 % | HEIGHT: 74 IN | HEART RATE: 99 BPM

## 2020-08-21 DIAGNOSIS — E11.65 TYPE 2 DIABETES MELLITUS WITH HYPERGLYCEMIA, WITHOUT LONG-TERM CURRENT USE OF INSULIN (HCC): ICD-10-CM

## 2020-08-21 DIAGNOSIS — E11.65 TYPE 2 DIABETES MELLITUS WITH HYPERGLYCEMIA, WITHOUT LONG-TERM CURRENT USE OF INSULIN (HCC): Primary | ICD-10-CM

## 2020-08-21 LAB
ANION GAP SERPL CALCULATED.3IONS-SCNC: 10.5 MMOL/L (ref 5–15)
BUN SERPL-MCNC: 12 MG/DL (ref 6–20)
BUN/CREAT SERPL: 14.1 (ref 7–25)
CALCIUM SPEC-SCNC: 9.2 MG/DL (ref 8.6–10.5)
CHLORIDE SERPL-SCNC: 103 MMOL/L (ref 98–107)
CO2 SERPL-SCNC: 23.5 MMOL/L (ref 22–29)
CREAT SERPL-MCNC: 0.85 MG/DL (ref 0.76–1.27)
GFR SERPL CREATININE-BSD FRML MDRD: 94 ML/MIN/1.73
GLUCOSE SERPL-MCNC: 125 MG/DL (ref 65–99)
HBA1C MFR BLD: 7.34 % (ref 4.8–5.6)
POTASSIUM SERPL-SCNC: 4.2 MMOL/L (ref 3.5–5.2)
SODIUM SERPL-SCNC: 137 MMOL/L (ref 136–145)

## 2020-08-21 PROCEDURE — 80048 BASIC METABOLIC PNL TOTAL CA: CPT

## 2020-08-21 PROCEDURE — 83036 HEMOGLOBIN GLYCOSYLATED A1C: CPT

## 2020-08-21 PROCEDURE — 99213 OFFICE O/P EST LOW 20 MIN: CPT | Performed by: FAMILY MEDICINE

## 2020-08-21 PROCEDURE — 36415 COLL VENOUS BLD VENIPUNCTURE: CPT

## 2020-08-21 NOTE — PROGRESS NOTES
"Follow Up Office Visit          Francis Whittaker is a 55 y.o. male that presents today for   Chief Complaint   Patient presents with   • Diabetes       HPI    Patient presents today for follow up diabetes.  Notes that he has been doing ok.  Appetite has returned to normal.  Patient is trying to make healthy food choices, and remain below 60 carbs per meal.  Current insulin regimen is Basaglar 22 units at bedtime and Humalog 7 units with breakfast and dinner, and 6 units with lunch. Fating glucose levels are usually around 170.  Lunch glucose levels are usually a little high, dinner and bedtime levels are within goal.  Patient has been having some difficulties with his blood glucose machine, as he says that sometimes the readings have been \"off\" and he has been getting an error code.  Patient's wife has a machine that he can use to check blood glucose when he is having these concerns.    Patient still having some anxiety symptoms.  He has prescription for Buspar, but has not needed to take it yet.  He has some hesitations about using medications for this complaint.      The following portions of the patient's history were reviewed and updated as appropriate: allergies, current medications, past medical history and problem list.    Review of Systems   Constitutional: Negative for chills and fatigue.   HENT: Negative for congestion and sore throat.    Respiratory: Negative for cough and shortness of breath.    Cardiovascular: Negative for chest pain and leg swelling.   Gastrointestinal: Negative for abdominal pain, diarrhea, nausea and vomiting.   Genitourinary: Negative for difficulty urinating.   Musculoskeletal: Negative for back pain and myalgias.   Skin: Negative for rash.   Neurological: Negative for numbness and headaches.   Psychiatric/Behavioral: Negative for dysphoric mood and sleep disturbance. The patient is nervous/anxious.            Vitals:    08/21/20 1101   BP: 106/86   Pulse: 99   SpO2: 96%   Weight: " "102 kg (224 lb)   Height: 188 cm (74\")     Body mass index is 28.76 kg/m².    Physical Exam   Constitutional: He is oriented to person, place, and time. He appears well-developed and well-nourished. No distress.   HENT:   Head: Normocephalic and atraumatic.   Mouth/Throat: Oropharynx is clear and moist.   Eyes: EOM are normal.   Neck: Neck supple.   Cardiovascular: Normal rate and regular rhythm.   No murmur heard.  Pulmonary/Chest: Effort normal and breath sounds normal. No respiratory distress. He has no wheezes.   Abdominal: Soft. There is no tenderness.   Musculoskeletal: He exhibits no edema.   Neurological: He is alert and oriented to person, place, and time.   Skin: Skin is warm and dry. No rash noted.   Psychiatric: He has a normal mood and affect.   Vitals reviewed.      Assessment/Plan   Francis was seen today for diabetes.    Diagnoses and all orders for this visit:    Type 2 diabetes mellitus with hyperglycemia, without long-term current use of insulin (CMS/[facility])  Patient's blood glucose levels slightly above range  Will check HgbA1c today  Last HgbA1c done 5/19/2020 was 6.7%  BMP today for kidney function  Insulin adjustment as below:   * Increase Basaglar 25 units at bedtime   * Humalog 8 units with breakfast, 6 units with lunch and 7 units with meals  Patient will continue to monitor blood glucose 4 times daily  Patient would like to try and eventually control his diabetes with diet and oral agents alone  Will start metformin very gradually to decrease risk of GI upset  -     Basic Metabolic Panel; Future  -     Hemoglobin A1c; Future  -     metFORMIN (Glucophage) 500 MG tablet; Take 0.5 tablets by mouth Daily With Breakfast.      Return in about 6 weeks (around 10/2/2020).            This document has been electronically signed by Caryn Titus MD on August 21, 2020 11:22      "

## 2020-08-24 ENCOUNTER — TELEPHONE (OUTPATIENT)
Dept: FAMILY MEDICINE CLINIC | Facility: CLINIC | Age: 55
End: 2020-08-24

## 2020-08-24 NOTE — TELEPHONE ENCOUNTER
Attempted to contact patient.  No answer and no place to leave a message.  MA spoke with patient's wife earlier, who reports that patient had an episode a couple of nights ago where he felt faint, nauseated, pale skin and diarrhea.  Wife notes that his blood pressure dropped to 80/60.  HR was in the 80s.  He was disoriented for about 10 seconds.  Will speak with patient/patient's wife when they return call.  Thanks, MARIELA Titus

## 2020-08-24 NOTE — TELEPHONE ENCOUNTER
Indira called and said Francis had a couple things go on this weekend and she thinks it is anxiety.  She needs Dr Titus to call her.

## 2020-08-25 NOTE — TELEPHONE ENCOUNTER
Spoke with patient's wife on the phone.  She confirmed note as below.  Blood glucose at time of event was checked and was 180.  Discussed that anxiety could cause symptoms below, but does not cause low blood pressure.  Some concern that blood pressure is getting too low, which can also cause the symptoms he was experiencing.  Advised monitoring blood pressure closely over the next week.  If too low, may need to decrease dose of metoprolol.  If he begins feeling bad again, especially if disoriented or low BP, he should be seen in ED.  Wife voiced understanding and will call in 1-2 weeks with blood glucose and blood pressure readings.  Thanks, MARIELA Titus

## 2020-08-27 ENCOUNTER — TELEPHONE (OUTPATIENT)
Dept: FAMILY MEDICINE CLINIC | Facility: CLINIC | Age: 55
End: 2020-08-27

## 2020-08-27 NOTE — TELEPHONE ENCOUNTER
PATTI IS REQUESTING A CALL BACK TO DISCUSS GETTING PAPER WORK FILLED OUT. DIDN'T GIVE MUCH INFORMATION OTHER THAN THAT.

## 2020-08-27 NOTE — LETTER
August 30, 2020     Patient: Francis Whittaker   YOB: 1965   Date of Visit: 8/27/2020       To Whom It May Concern:    Francis Whittaker is currently under my medical care.  Due to recent health events, it is my medical opinion that he is physically unable to work in the same capacity in which he did previously as a .  If you have any questions or concerns, please don't hesitate to call.         Sincerely,              Caryn Titus MD   Family Medicine Physician  [facility]

## 2020-09-01 DIAGNOSIS — E11.65 TYPE 2 DIABETES MELLITUS WITH HYPERGLYCEMIA, WITHOUT LONG-TERM CURRENT USE OF INSULIN (HCC): ICD-10-CM

## 2020-09-01 RX ORDER — PEN NEEDLE, DIABETIC 30 GX3/16"
1 NEEDLE, DISPOSABLE MISCELLANEOUS
Qty: 100 EACH | Refills: 2 | Status: SHIPPED | OUTPATIENT
Start: 2020-09-01 | End: 2020-11-23 | Stop reason: SDUPTHER

## 2020-09-24 ENCOUNTER — TELEPHONE (OUTPATIENT)
Dept: FAMILY MEDICINE CLINIC | Facility: CLINIC | Age: 55
End: 2020-09-24

## 2020-09-24 DIAGNOSIS — E11.65 TYPE 2 DIABETES MELLITUS WITH HYPERGLYCEMIA, WITHOUT LONG-TERM CURRENT USE OF INSULIN (HCC): ICD-10-CM

## 2020-09-24 RX ORDER — BUSPIRONE HYDROCHLORIDE 5 MG/1
5 TABLET ORAL 2 TIMES DAILY
Qty: 60 TABLET | Refills: 2 | Status: SHIPPED | OUTPATIENT
Start: 2020-09-24 | End: 2020-10-02

## 2020-09-24 NOTE — TELEPHONE ENCOUNTER
Indira called wanting to give Francis's blood pressure reading and sugar level. They have an appt at 930 so they may not answer around that time. If not able to call before 930 she asked to be called after 11.    Thank you

## 2020-09-25 NOTE — TELEPHONE ENCOUNTER
23rd  Breakfast 168  Lunch 124  Dinner 99  Bedtime 144    22nd  Breakfast 175  Lunch 136  Dinner 107  Bedtime 99    21st  Breakfast 138  Lunch 140  Dinner 99  Bedtime 157    20th  Breakfast 158  Lunch- No lunch  Dinner 184  Bedtime 168    19th  Breakfast 174  Lunch 113  Dinner 177  Bedtime 219 (He had eaten right before bed)    BP READINGS  23rd   Morning 125/89  Night 112/86    22nd  Morning 132/94  Night 123.91    21st  Morning 130/87  Night 133/87    20th  Morning 138/92  Night 129/82    19th  Morning 130/86  Night 123/88

## 2020-09-28 NOTE — TELEPHONE ENCOUNTER
Reviewed blood glucose and blood pressure log that was given to MA.  Spoke with patient's wife.  The following medication adjustments were made.    Increase Basaglar to 26 units at bedtime    Patient should continue with Humalog at current dosing, 8 units with breakfast, 6 units with lunch and 7 units with dinner.  He will continue to monitor blood glucose closely.  Overall blood pressures have been doing fine.  He will continue metoprolol 12.5 mg twice daily at this time.    Patient's wife voiced understanding, and will relay message to patient.  Thanks, Andrea

## 2020-10-01 PROBLEM — I48.91 RAPID ATRIAL FIBRILLATION: Status: ACTIVE | Noted: 2020-02-09

## 2020-10-01 RX ORDER — BLOOD SUGAR DIAGNOSTIC
STRIP MISCELLANEOUS
COMMUNITY
Start: 2020-09-01 | End: 2020-10-05 | Stop reason: SDUPTHER

## 2020-10-01 RX ORDER — INSULIN GLARGINE 100 [IU]/ML
INJECTION, SOLUTION SUBCUTANEOUS
COMMUNITY
Start: 2020-07-23 | End: 2020-11-18 | Stop reason: SDUPTHER

## 2020-10-02 ENCOUNTER — OFFICE VISIT (OUTPATIENT)
Dept: FAMILY MEDICINE CLINIC | Facility: CLINIC | Age: 55
End: 2020-10-02

## 2020-10-02 VITALS
SYSTOLIC BLOOD PRESSURE: 130 MMHG | OXYGEN SATURATION: 98 % | RESPIRATION RATE: 20 BRPM | WEIGHT: 223.4 LBS | DIASTOLIC BLOOD PRESSURE: 74 MMHG | HEART RATE: 108 BPM | BODY MASS INDEX: 28.67 KG/M2 | HEIGHT: 74 IN

## 2020-10-02 DIAGNOSIS — F41.9 ANXIETY: ICD-10-CM

## 2020-10-02 DIAGNOSIS — E11.65 TYPE 2 DIABETES MELLITUS WITH HYPERGLYCEMIA, WITHOUT LONG-TERM CURRENT USE OF INSULIN (HCC): Primary | ICD-10-CM

## 2020-10-02 DIAGNOSIS — E11.9 ENCOUNTER FOR DIABETIC FOOT EXAM (HCC): ICD-10-CM

## 2020-10-02 DIAGNOSIS — I35.0 SEVERE AORTIC STENOSIS: ICD-10-CM

## 2020-10-02 PROCEDURE — 99214 OFFICE O/P EST MOD 30 MIN: CPT | Performed by: FAMILY MEDICINE

## 2020-10-02 RX ORDER — BUSPIRONE HYDROCHLORIDE 7.5 MG/1
7.5 TABLET ORAL 2 TIMES DAILY
Qty: 30 TABLET | Refills: 2 | Status: SHIPPED | OUTPATIENT
Start: 2020-10-02 | End: 2020-12-11 | Stop reason: SDUPTHER

## 2020-10-02 NOTE — PROGRESS NOTES
Follow Up Office Visit          Francis Whittaker is a 55 y.o. male that presents today for   Chief Complaint   Patient presents with   • Diabetes     6 week recheck       HPI     Patient presents today for follow-up diabetes and anxiety.  Blood glucose levels are doing good.  Patient brings with him blood glucose log for the last couple weeks.  The majority of fasting and preprandial blood glucose readings are within goal.  Patient is currently taking Basaglar 26 units at bedtime.  He is also taking Humalog 8 units with breakfast, 6 units with lunch and 7 units with dinner.  No hypoglycemia episodes.  Patient is watching the amount of carbs with each meal.  No new concerns.  At some point, patient would like to switch to oral medications if possible.    Patient has started taking BuSpar 5 mg twice daily to help with his anxiety.  Still notes that he is having anxiety symptoms at times, especially in social situations.  Not having any adverse effects from this medication.  Feels like dosage may need to be increased a little.  Patient specifically notes that he had an appointment with his , at which point he broke down and had to leave due to anxiety symptoms.    The following portions of the patient's history were reviewed and updated as appropriate: allergies, current medications, past medical history and problem list.    Review of Systems   Constitutional: Negative for chills and fatigue.   HENT: Negative for congestion and sore throat.    Respiratory: Negative for cough and shortness of breath.    Cardiovascular: Negative for chest pain and leg swelling.   Gastrointestinal: Negative for abdominal pain, diarrhea, nausea and vomiting.   Genitourinary: Negative for difficulty urinating.   Musculoskeletal: Negative for back pain and myalgias.   Skin: Negative for rash.   Neurological: Negative for numbness and headaches.   Psychiatric/Behavioral: Negative for dysphoric mood and sleep disturbance. The patient is  "nervous/anxious.              Vitals:    10/02/20 1113   BP: 130/74   BP Location: Right arm   Patient Position: Sitting   Cuff Size: Adult   Pulse: 108   Resp: 20   SpO2: 98%   Weight: 101 kg (223 lb 6.4 oz)   Height: 188 cm (74\")     Body mass index is 28.68 kg/m².    Physical Exam  Vitals signs reviewed.   Constitutional:       General: He is not in acute distress.     Appearance: He is well-developed.   HENT:      Head: Normocephalic and atraumatic.   Neck:      Musculoskeletal: Neck supple.   Cardiovascular:      Rate and Rhythm: Normal rate and regular rhythm.      Heart sounds: No murmur.   Pulmonary:      Effort: Pulmonary effort is normal. No respiratory distress.      Breath sounds: Normal breath sounds. No wheezing.   Abdominal:      Palpations: Abdomen is soft.      Tenderness: There is no abdominal tenderness.   Musculoskeletal:      Right foot: Normal range of motion. No deformity.      Left foot: Normal range of motion. No deformity.   Feet:      Right foot:      Skin integrity: Skin integrity normal. No skin breakdown.      Toenail Condition: Right toenails are abnormally thick.      Left foot:      Skin integrity: Skin integrity normal. No skin breakdown.      Toenail Condition: Left toenails are abnormally thick.      Comments: Patient performed monofilament test on his feet with instruction, which was normal.  Patient declined palpation/monofilament testing done by MD.  Skin:     General: Skin is warm and dry.      Findings: No rash.   Neurological:      Mental Status: He is alert and oriented to person, place, and time.           Assessment/Plan   Francis was seen today for diabetes.    Diagnoses and all orders for this visit:    Type 2 diabetes mellitus with hyperglycemia, without long-term current use of insulin (CMS/[facility])    Encounter for diabetic foot exam (CMS/[facility])    Anxiety  -     busPIRone (BUSPAR) 7.5 MG tablet; Take 1 tablet by mouth 2 (two) times a day.    Severe aortic stenosis      Most " blood glucose levels from home readings within goal  Last hemoglobin A1c done 8/21/2020 was 7.34%  Continue current insulin regimen  Basaglar 26 units at bedtime  Humalog 8 units with breakfast, 6 units with lunch and 7 units with dinner  We will plan to discuss at future visits the possibility of weaning off insulin and onto oral/other injectable medications  Diabetic foot exam performed today, with the exception of vascular status, as patient does not like his feet being touched -normal  Patient tolerating BuSpar without difficulty  Still having anxiety symptoms  Will increase BuSpar to 7.5 mg once daily  Patient is following with cardiology in [place] regarding his severe aortic stenosis  Has appointment next month for follow-up          This document has been electronically signed by Caryn Titus MD on October 2, 2020 11:21 CDT

## 2020-10-05 RX ORDER — BLOOD SUGAR DIAGNOSTIC
STRIP MISCELLANEOUS
Qty: 100 EACH | Refills: 4 | Status: SHIPPED | OUTPATIENT
Start: 2020-10-05 | End: 2021-02-03 | Stop reason: SDUPTHER

## 2020-11-18 RX ORDER — INSULIN GLARGINE 100 [IU]/ML
22 INJECTION, SOLUTION SUBCUTANEOUS NIGHTLY
Qty: 3 PEN | Refills: 2 | Status: SHIPPED | OUTPATIENT
Start: 2020-11-18 | End: 2021-01-04

## 2020-11-23 DIAGNOSIS — E11.65 TYPE 2 DIABETES MELLITUS WITH HYPERGLYCEMIA, WITHOUT LONG-TERM CURRENT USE OF INSULIN (HCC): ICD-10-CM

## 2020-11-23 RX ORDER — PEN NEEDLE, DIABETIC 30 GX3/16"
1 NEEDLE, DISPOSABLE MISCELLANEOUS
Qty: 100 EACH | Refills: 2 | Status: SHIPPED | OUTPATIENT
Start: 2020-11-23 | End: 2021-02-09 | Stop reason: SDUPTHER

## 2020-12-11 DIAGNOSIS — F41.9 ANXIETY: ICD-10-CM

## 2020-12-11 RX ORDER — BUSPIRONE HYDROCHLORIDE 7.5 MG/1
7.5 TABLET ORAL 2 TIMES DAILY
Qty: 30 TABLET | Refills: 2 | Status: SHIPPED | OUTPATIENT
Start: 2020-12-11 | End: 2021-01-04

## 2020-12-23 ENCOUNTER — TELEPHONE (OUTPATIENT)
Dept: FAMILY MEDICINE CLINIC | Facility: CLINIC | Age: 55
End: 2020-12-23

## 2020-12-23 DIAGNOSIS — I48.91 ATRIAL FIBRILLATION, UNSPECIFIED TYPE (HCC): ICD-10-CM

## 2020-12-23 DIAGNOSIS — I35.0 AORTIC VALVE STENOSIS, ETIOLOGY OF CARDIAC VALVE DISEASE UNSPECIFIED: Primary | ICD-10-CM

## 2020-12-23 NOTE — TELEPHONE ENCOUNTER
PATIENT'S WIFE CALLING IN REGARDS TO PATIENTS BP KIT QUITTING AND IS WANTING TO KNOW IF HIS INSURANCE WILL COVER IT OR IF THEY NEED TO PURCHASE ANOTHER ONE.     PATTI SAID SHE IS AT [facility] RIGHT NOW AND WOULD LIKE A CALLBACK WITH WHAT SHE SHOULD DO OR IF PASSPORT WILL COVER THE BP KIT.    PATTI CALLBACK # [phone]

## 2020-12-23 NOTE — TELEPHONE ENCOUNTER
Usually they do not cover, but she can call insurance and ask.  Happy to send a prescription for one if they do.  Thanks, MARIELA Titus

## 2020-12-23 NOTE — TELEPHONE ENCOUNTER
Called and let Darlin know that Dr. Titus can send RX to [facility] for them to see if insurance will cover. If they won't cover it, they will purchase one on their own.

## 2021-01-04 ENCOUNTER — OFFICE VISIT (OUTPATIENT)
Dept: FAMILY MEDICINE CLINIC | Facility: CLINIC | Age: 56
End: 2021-01-04

## 2021-01-04 ENCOUNTER — LAB (OUTPATIENT)
Dept: LAB | Facility: HOSPITAL | Age: 56
End: 2021-01-04

## 2021-01-04 VITALS
SYSTOLIC BLOOD PRESSURE: 122 MMHG | BODY MASS INDEX: 29.52 KG/M2 | HEART RATE: 98 BPM | DIASTOLIC BLOOD PRESSURE: 88 MMHG | WEIGHT: 230 LBS | OXYGEN SATURATION: 98 % | HEIGHT: 74 IN

## 2021-01-04 DIAGNOSIS — I35.0 SEVERE AORTIC STENOSIS: Chronic | ICD-10-CM

## 2021-01-04 DIAGNOSIS — E11.65 TYPE 2 DIABETES MELLITUS WITH HYPERGLYCEMIA, WITHOUT LONG-TERM CURRENT USE OF INSULIN (HCC): Primary | Chronic | ICD-10-CM

## 2021-01-04 DIAGNOSIS — E11.65 TYPE 2 DIABETES MELLITUS WITH HYPERGLYCEMIA, WITHOUT LONG-TERM CURRENT USE OF INSULIN (HCC): ICD-10-CM

## 2021-01-04 DIAGNOSIS — F41.9 ANXIETY: Chronic | ICD-10-CM

## 2021-01-04 LAB
ALBUMIN UR-MCNC: 10.7 MG/DL
ANION GAP SERPL CALCULATED.3IONS-SCNC: 10.1 MMOL/L (ref 5–15)
BUN SERPL-MCNC: 14 MG/DL (ref 6–20)
BUN/CREAT SERPL: 16.7 (ref 7–25)
CALCIUM SPEC-SCNC: 9.3 MG/DL (ref 8.6–10.5)
CHLORIDE SERPL-SCNC: 102 MMOL/L (ref 98–107)
CO2 SERPL-SCNC: 25.9 MMOL/L (ref 22–29)
CREAT SERPL-MCNC: 0.84 MG/DL (ref 0.76–1.27)
CREAT UR-MCNC: 102.2 MG/DL
GFR SERPL CREATININE-BSD FRML MDRD: 95 ML/MIN/1.73
GLUCOSE SERPL-MCNC: 220 MG/DL (ref 65–99)
HBA1C MFR BLD: 7.4 % (ref 4.8–5.6)
MICROALBUMIN/CREAT UR: 104.7 MG/G
POTASSIUM SERPL-SCNC: 4.4 MMOL/L (ref 3.5–5.2)
SODIUM SERPL-SCNC: 138 MMOL/L (ref 136–145)

## 2021-01-04 PROCEDURE — 82043 UR ALBUMIN QUANTITATIVE: CPT

## 2021-01-04 PROCEDURE — 80048 BASIC METABOLIC PNL TOTAL CA: CPT

## 2021-01-04 PROCEDURE — 83036 HEMOGLOBIN GLYCOSYLATED A1C: CPT

## 2021-01-04 PROCEDURE — 82570 ASSAY OF URINE CREATININE: CPT

## 2021-01-04 PROCEDURE — 36415 COLL VENOUS BLD VENIPUNCTURE: CPT

## 2021-01-04 PROCEDURE — 99214 OFFICE O/P EST MOD 30 MIN: CPT | Performed by: FAMILY MEDICINE

## 2021-01-04 RX ORDER — INSULIN GLARGINE 100 [IU]/ML
26 INJECTION, SOLUTION SUBCUTANEOUS NIGHTLY
Qty: 3 PEN | Refills: 2 | Status: SHIPPED | OUTPATIENT
Start: 2021-01-04 | End: 2021-01-04

## 2021-01-04 RX ORDER — INSULIN GLARGINE 100 [IU]/ML
INJECTION, SOLUTION SUBCUTANEOUS
COMMUNITY
Start: 2020-11-18 | End: 2021-01-04

## 2021-01-04 RX ORDER — BUSPIRONE HYDROCHLORIDE 7.5 MG/1
7.5 TABLET ORAL 3 TIMES DAILY
Qty: 30 TABLET | Refills: 2 | Status: SHIPPED | OUTPATIENT
Start: 2021-01-04 | End: 2021-02-01 | Stop reason: SDUPTHER

## 2021-01-04 RX ORDER — INSULIN LISPRO 100 [IU]/ML
INJECTION, SOLUTION INTRAVENOUS; SUBCUTANEOUS
COMMUNITY
Start: 2020-12-19 | End: 2021-02-19 | Stop reason: SDUPTHER

## 2021-01-04 RX ORDER — INSULIN GLARGINE 100 [IU]/ML
28 INJECTION, SOLUTION SUBCUTANEOUS NIGHTLY
Qty: 3 PEN | Refills: 2
Start: 2021-01-04 | End: 2021-02-19 | Stop reason: SDUPTHER

## 2021-01-07 ENCOUNTER — TELEPHONE (OUTPATIENT)
Dept: FAMILY MEDICINE CLINIC | Facility: CLINIC | Age: 56
End: 2021-01-07

## 2021-01-11 ENCOUNTER — TELEPHONE (OUTPATIENT)
Dept: FAMILY MEDICINE CLINIC | Facility: CLINIC | Age: 56
End: 2021-01-11

## 2021-01-11 NOTE — TELEPHONE ENCOUNTER
Ms Panfilo has been called and advised that he can stop the Metformin and continue with all other medications

## 2021-01-11 NOTE — TELEPHONE ENCOUNTER
Ms. Whittaker    States the Metformin he was restarted on at his last OV is causing bad diarrhea.  She states after he eats its not long before he is in the bathroom.   She said with his anxiety level, if it keeps up he will stop the medication.     Do you want to change it to something else?   If so, send it to [facility] in [place].     Please Advise

## 2021-01-11 NOTE — TELEPHONE ENCOUNTER
PATIENTS WIFE IS WANTING TO SPEAK WITH CLINICAL STAFF REGARDING HIS PRESCRIPTIONS.    BEST CALL BACK [phone]

## 2021-02-01 ENCOUNTER — OFFICE VISIT (OUTPATIENT)
Dept: FAMILY MEDICINE CLINIC | Facility: CLINIC | Age: 56
End: 2021-02-01

## 2021-02-01 VITALS
HEART RATE: 91 BPM | HEIGHT: 74 IN | DIASTOLIC BLOOD PRESSURE: 74 MMHG | WEIGHT: 236 LBS | OXYGEN SATURATION: 99 % | SYSTOLIC BLOOD PRESSURE: 124 MMHG | BODY MASS INDEX: 30.29 KG/M2

## 2021-02-01 DIAGNOSIS — E11.65 TYPE 2 DIABETES MELLITUS WITH HYPERGLYCEMIA, WITHOUT LONG-TERM CURRENT USE OF INSULIN (HCC): Primary | ICD-10-CM

## 2021-02-01 DIAGNOSIS — I35.0 SEVERE AORTIC STENOSIS: ICD-10-CM

## 2021-02-01 DIAGNOSIS — F41.9 ANXIETY: ICD-10-CM

## 2021-02-01 PROCEDURE — 99213 OFFICE O/P EST LOW 20 MIN: CPT | Performed by: FAMILY MEDICINE

## 2021-02-01 RX ORDER — BUSPIRONE HYDROCHLORIDE 7.5 MG/1
7.5 TABLET ORAL 2 TIMES DAILY
Qty: 60 TABLET | Refills: 2 | Status: SHIPPED | OUTPATIENT
Start: 2021-02-01 | End: 2021-05-13

## 2021-02-03 RX ORDER — BLOOD SUGAR DIAGNOSTIC
STRIP MISCELLANEOUS
Qty: 200 EACH | Refills: 4 | Status: SHIPPED | OUTPATIENT
Start: 2021-02-03 | End: 2021-09-01

## 2021-02-09 DIAGNOSIS — E11.65 TYPE 2 DIABETES MELLITUS WITH HYPERGLYCEMIA, WITHOUT LONG-TERM CURRENT USE OF INSULIN (HCC): ICD-10-CM

## 2021-02-09 RX ORDER — PEN NEEDLE, DIABETIC 30 GX3/16"
1 NEEDLE, DISPOSABLE MISCELLANEOUS
Qty: 100 EACH | Refills: 2 | Status: SHIPPED | OUTPATIENT
Start: 2021-02-09 | End: 2021-04-30

## 2021-02-09 NOTE — TELEPHONE ENCOUNTER
Caller: PATTI SANDOVAL    Relationship: Emergency Contact    Best call back number: [phone]    MEDICATION REQUESTED:    INSULIN PEN NEEDLES  When do you need the refill by: A    What details did the patient provide when requesting the medication: MEDICATION REFILL    Does the patient have less than a 3 day supply:  [x] Yes  [] No    What is the patient's preferred pharmacy:

## 2021-02-19 ENCOUNTER — TELEPHONE (OUTPATIENT)
Dept: FAMILY MEDICINE CLINIC | Facility: CLINIC | Age: 56
End: 2021-02-19

## 2021-02-19 DIAGNOSIS — E11.65 TYPE 2 DIABETES MELLITUS WITH HYPERGLYCEMIA, WITHOUT LONG-TERM CURRENT USE OF INSULIN (HCC): Chronic | ICD-10-CM

## 2021-02-19 RX ORDER — INSULIN LISPRO 100 [IU]/ML
10 INJECTION, SOLUTION INTRAVENOUS; SUBCUTANEOUS
Qty: 9 ML | Refills: 2 | Status: SHIPPED | OUTPATIENT
Start: 2021-02-19 | End: 2021-03-16 | Stop reason: SDUPTHER

## 2021-02-19 RX ORDER — INSULIN GLARGINE 100 [IU]/ML
32 INJECTION, SOLUTION SUBCUTANEOUS NIGHTLY
Qty: 3 PEN | Refills: 2
Start: 2021-02-19 | End: 2021-05-13 | Stop reason: SDUPTHER

## 2021-03-16 DIAGNOSIS — E11.65 TYPE 2 DIABETES MELLITUS WITH HYPERGLYCEMIA, WITHOUT LONG-TERM CURRENT USE OF INSULIN (HCC): Chronic | ICD-10-CM

## 2021-03-16 RX ORDER — INSULIN LISPRO 100 [IU]/ML
10 INJECTION, SOLUTION INTRAVENOUS; SUBCUTANEOUS
Qty: 9 ML | Refills: 2 | Status: SHIPPED | OUTPATIENT
Start: 2021-03-16 | End: 2021-10-20

## 2021-03-17 DIAGNOSIS — E11.65 TYPE 2 DIABETES MELLITUS WITH HYPERGLYCEMIA, WITHOUT LONG-TERM CURRENT USE OF INSULIN (HCC): ICD-10-CM

## 2021-03-17 NOTE — TELEPHONE ENCOUNTER
Metformin was DC 01/11/2021 by Dr Titus for side effects.   He was told to DC this and remain on his Insulin    Refill Denied

## 2021-04-10 ENCOUNTER — DOCUMENTATION (OUTPATIENT)
Dept: FAMILY MEDICINE CLINIC | Facility: CLINIC | Age: 56
End: 2021-04-10

## 2021-04-10 RX ORDER — VALACYCLOVIR HYDROCHLORIDE 1 G/1
1000 TABLET, FILM COATED ORAL 3 TIMES DAILY
Qty: 21 TABLET | Refills: 0 | Status: SHIPPED | OUTPATIENT
Start: 2021-04-10 | End: 2021-04-17

## 2021-04-10 RX ORDER — VALACYCLOVIR HYDROCHLORIDE 1 G/1
1000 TABLET, FILM COATED ORAL 3 TIMES DAILY
Qty: 21 TABLET | Refills: 0 | Status: SHIPPED | OUTPATIENT
Start: 2021-04-10 | End: 2021-04-10 | Stop reason: SDUPTHER

## 2021-04-30 DIAGNOSIS — E11.65 TYPE 2 DIABETES MELLITUS WITH HYPERGLYCEMIA, WITHOUT LONG-TERM CURRENT USE OF INSULIN (HCC): ICD-10-CM

## 2021-04-30 RX ORDER — PEN NEEDLE, DIABETIC 32GX 5/32"
NEEDLE, DISPOSABLE MISCELLANEOUS
Qty: 100 EACH | Refills: 2 | Status: SHIPPED | OUTPATIENT
Start: 2021-04-30 | End: 2021-05-12 | Stop reason: SDUPTHER

## 2021-05-03 ENCOUNTER — LAB (OUTPATIENT)
Dept: LAB | Facility: HOSPITAL | Age: 56
End: 2021-05-03

## 2021-05-03 ENCOUNTER — TELEPHONE (OUTPATIENT)
Dept: FAMILY MEDICINE CLINIC | Facility: CLINIC | Age: 56
End: 2021-05-03

## 2021-05-03 ENCOUNTER — OFFICE VISIT (OUTPATIENT)
Dept: FAMILY MEDICINE CLINIC | Facility: CLINIC | Age: 56
End: 2021-05-03

## 2021-05-03 VITALS
HEART RATE: 104 BPM | OXYGEN SATURATION: 98 % | BODY MASS INDEX: 29.77 KG/M2 | HEIGHT: 74 IN | DIASTOLIC BLOOD PRESSURE: 74 MMHG | SYSTOLIC BLOOD PRESSURE: 122 MMHG | WEIGHT: 232 LBS

## 2021-05-03 DIAGNOSIS — F41.9 ANXIETY: ICD-10-CM

## 2021-05-03 DIAGNOSIS — Z86.19 HISTORY OF SHINGLES: ICD-10-CM

## 2021-05-03 DIAGNOSIS — I35.0 SEVERE AORTIC STENOSIS: ICD-10-CM

## 2021-05-03 DIAGNOSIS — E11.65 TYPE 2 DIABETES MELLITUS WITH HYPERGLYCEMIA, WITHOUT LONG-TERM CURRENT USE OF INSULIN (HCC): ICD-10-CM

## 2021-05-03 DIAGNOSIS — E11.65 TYPE 2 DIABETES MELLITUS WITH HYPERGLYCEMIA, WITHOUT LONG-TERM CURRENT USE OF INSULIN (HCC): Primary | ICD-10-CM

## 2021-05-03 LAB
ALBUMIN SERPL-MCNC: 4.6 G/DL (ref 3.5–5.2)
ALBUMIN/GLOB SERPL: 1.5 G/DL
ALP SERPL-CCNC: 63 U/L (ref 39–117)
ALT SERPL W P-5'-P-CCNC: 47 U/L (ref 1–41)
ANION GAP SERPL CALCULATED.3IONS-SCNC: 9.4 MMOL/L (ref 5–15)
AST SERPL-CCNC: 21 U/L (ref 1–40)
BILIRUB SERPL-MCNC: 0.3 MG/DL (ref 0–1.2)
BUN SERPL-MCNC: 15 MG/DL (ref 6–20)
BUN/CREAT SERPL: 20 (ref 7–25)
CALCIUM SPEC-SCNC: 9.6 MG/DL (ref 8.6–10.5)
CHLORIDE SERPL-SCNC: 103 MMOL/L (ref 98–107)
CO2 SERPL-SCNC: 26.6 MMOL/L (ref 22–29)
CREAT SERPL-MCNC: 0.75 MG/DL (ref 0.76–1.27)
GFR SERPL CREATININE-BSD FRML MDRD: 108 ML/MIN/1.73
GLOBULIN UR ELPH-MCNC: 3 GM/DL
GLUCOSE SERPL-MCNC: 231 MG/DL (ref 65–99)
HBA1C MFR BLD: 7.76 % (ref 4.8–5.6)
POTASSIUM SERPL-SCNC: 4.5 MMOL/L (ref 3.5–5.2)
PROT SERPL-MCNC: 7.6 G/DL (ref 6–8.5)
SODIUM SERPL-SCNC: 139 MMOL/L (ref 136–145)

## 2021-05-03 PROCEDURE — 83036 HEMOGLOBIN GLYCOSYLATED A1C: CPT

## 2021-05-03 PROCEDURE — 80053 COMPREHEN METABOLIC PANEL: CPT

## 2021-05-03 PROCEDURE — 36415 COLL VENOUS BLD VENIPUNCTURE: CPT

## 2021-05-03 PROCEDURE — 99214 OFFICE O/P EST MOD 30 MIN: CPT | Performed by: FAMILY MEDICINE

## 2021-05-03 NOTE — PROGRESS NOTES
"Chief Complaint  Follow-up (3 month recheck)    Subjective          Francis Whittaker presents to [facility]  History of Present Illness    Patient presents today for follow-up diabetes.  Says that blood glucose levels have been a little bit more uncontrolled, however patient is eating more.  Checking blood glucose levels 4 times daily.  Currently using Lantus 32 units at bedtime and Humalog 10 units with meals.  Says that he is not always following a diabetic diet.  Sometimes has blood sugar spikes if he eats the wrong foods.  Patient was sticking to 60 g of carb per meal, however felt like he was starting on this regimen, so has had to increase some.  He is not monitoring carb count at this time.  Fasting glucose levels are consistently between 190-220.  Daytime glucose levels are as low as 140s, but this is dependent on what he eats.  No hypoglycemia.  Patient is restricted in his activity level secondary to severe aortic stenosis.  Restrictions by cardiology.    Patient had recent episode of shingles.  Took antiviral medication, and symptoms lasted about 1 week.  All lesions are resolving at this time, some scarring still appreciated.    Objective   Vital Signs:   /74   Pulse 104   Ht 188 cm (74\")   Wt 105 kg (232 lb)   SpO2 98%   BMI 29.79 kg/m²     Physical Exam  Vitals reviewed.   Constitutional:       General: He is not in acute distress.     Appearance: He is well-developed.   HENT:      Head: Normocephalic and atraumatic.   Cardiovascular:      Rate and Rhythm: Normal rate and regular rhythm.      Heart sounds: No murmur heard.     Pulmonary:      Effort: Pulmonary effort is normal. No respiratory distress.      Breath sounds: Normal breath sounds. No wheezing.   Abdominal:      Palpations: Abdomen is soft.      Tenderness: There is no abdominal tenderness.   Musculoskeletal:      Cervical back: Neck supple.   Skin:     General: Skin is warm and dry.      Findings: No " rash.      Comments: Left flank has scarring skin lesions present in distinctive dermatome appearance consistent with history of shingles, no scabbing and no open lesions at this time   Neurological:      Mental Status: He is alert and oriented to person, place, and time.        Result Review :   The following data was reviewed by: Caryn Titus MD on 05/03/2021:    Lab Results   Component Value Date    HGBA1C 7.40 (H) 01/04/2021     Lab Results   Component Value Date    GLUCOSE 220 (H) 01/04/2021    BUN 14 01/04/2021    CREATININE 0.84 01/04/2021    EGFRIFNONA 95 01/04/2021    BCR 16.7 01/04/2021    K 4.4 01/04/2021    CO2 25.9 01/04/2021    CALCIUM 9.3 01/04/2021    ALBUMIN 3.9 02/09/2020     (H) 02/09/2020     (H) 02/09/2020                 Assessment and Plan    Diagnoses and all orders for this visit:    1. Type 2 diabetes mellitus with hyperglycemia, without long-term current use of insulin (CMS/[facility]) (Primary)  -     Comprehensive Metabolic Panel; Future  -     Hemoglobin A1c; Future    2. Anxiety    3. Severe aortic stenosis  -     Comprehensive Metabolic Panel; Future    4. History of shingles      Patient presents today for follow-up.  Type 2 diabetes not well controlled.  Patient having hyperglycemia.  Will increase Lantus to 35 units at bedtime.  Continue rapid acting 10 units with meals.  Patient encouraged to monitor his carb intake.  Instructed on reading nutrition labels to get this information.  Patient is not on statin medication.  After review of CMP done at outside facility, liver enzymes were elevated at time of presentation with cardiac symptoms.  Will need to recheck liver enzymes today.  If still elevated, additional management will be needed.  If levels have returned to normal, can consider addition of statin medication to decrease patient's cardiovascular risk in the future.  Risk and benefits of statin medications discussed and will let patient know if we are going to  start this or not.  Anxiety symptoms controlled, continue BuSpar.  Patient following with cardiology for severe aortic stenosis.  He is not having any symptoms at this time.  Patient has been advised to not lift more than 25 pounds and to avoid activities that cause exertion.  For this reason, discussed with patient the need to monitor diet closely.  Shingles rash healed - treated with valcyclovir.  Patient does not have any pain, but continues to have some itching.  Advised that this will likely get better with time.      Follow Up   Return in about 3 months (around 8/3/2021) for Recheck.  Patient was given instructions and counseling regarding his condition or for health maintenance advice. Please see specific information pulled into the AVS if appropriate.         This document has been electronically signed by Caryn Titus MD

## 2021-05-04 NOTE — TELEPHONE ENCOUNTER
Please call and let patient know the following labs:    - HgbA1c slightly worse at 7.76%.  Continue with insulin adjustment and dietary changes as discussed at office visit.   - CMP shows liver enzymes improved.  Plan to recheck with next set if labs and wait for normal enzymes to start statin medication.      ThanksMARIELA

## 2021-05-12 DIAGNOSIS — F41.9 ANXIETY: ICD-10-CM

## 2021-05-12 DIAGNOSIS — E11.65 TYPE 2 DIABETES MELLITUS WITH HYPERGLYCEMIA, WITHOUT LONG-TERM CURRENT USE OF INSULIN (HCC): ICD-10-CM

## 2021-05-13 DIAGNOSIS — E11.65 TYPE 2 DIABETES MELLITUS WITH HYPERGLYCEMIA, WITHOUT LONG-TERM CURRENT USE OF INSULIN (HCC): Chronic | ICD-10-CM

## 2021-05-13 RX ORDER — PEN NEEDLE, DIABETIC 32GX 5/32"
NEEDLE, DISPOSABLE MISCELLANEOUS
Qty: 200 EACH | Refills: 2 | Status: SHIPPED | OUTPATIENT
Start: 2021-05-13 | End: 2021-09-27

## 2021-05-13 RX ORDER — INSULIN GLARGINE 100 [IU]/ML
32 INJECTION, SOLUTION SUBCUTANEOUS NIGHTLY
Qty: 3 PEN | Refills: 2 | Status: SHIPPED | OUTPATIENT
Start: 2021-05-13 | End: 2021-08-03

## 2021-05-13 RX ORDER — INSULIN GLARGINE 100 [IU]/ML
INJECTION, SOLUTION SUBCUTANEOUS
Qty: 9 ML | OUTPATIENT
Start: 2021-05-13

## 2021-05-13 RX ORDER — BUSPIRONE HYDROCHLORIDE 7.5 MG/1
TABLET ORAL
Qty: 60 TABLET | Refills: 2 | Status: SHIPPED | OUTPATIENT
Start: 2021-05-13 | End: 2021-09-02

## 2021-05-14 RX ORDER — APIXABAN 5 MG/1
TABLET, FILM COATED ORAL
Qty: 60 TABLET | Refills: 2 | Status: SHIPPED | OUTPATIENT
Start: 2021-05-14 | End: 2021-08-09

## 2021-06-14 ENCOUNTER — TELEPHONE (OUTPATIENT)
Dept: FAMILY MEDICINE CLINIC | Facility: CLINIC | Age: 56
End: 2021-06-14

## 2021-06-14 NOTE — TELEPHONE ENCOUNTER
Called and spoke to patient wife. She wanted to let us know that patient went and seen cardiologist in [place] last week. They are wanting to do open heart surgery as soon as possible, but they told patient he would need to have all his teeth removed prior. Patient has a dental appt on Friday, June 18th. She asked that you read through records sent from cardiologist in [place] and call patient and explain stuff to him. His anxiety has gotten worse since learning of this surgery, and they were given options on which valve to use and he would like your opinion.

## 2021-06-15 ENCOUNTER — TELEPHONE (OUTPATIENT)
Dept: FAMILY MEDICINE CLINIC | Facility: CLINIC | Age: 56
End: 2021-06-15

## 2021-06-15 NOTE — TELEPHONE ENCOUNTER
Indira called and said Dr Titus would have to the doctors office in [place] to get the office note for last week when Francis was seen there.  She said his heart doctor is Dr Samayoa and the surgeon is Dr Estrada    [phone]

## 2021-06-17 ENCOUNTER — TELEPHONE (OUTPATIENT)
Dept: FAMILY MEDICINE CLINIC | Facility: CLINIC | Age: 56
End: 2021-06-17

## 2021-06-17 NOTE — TELEPHONE ENCOUNTER
Mr. Whittaker is supposed to have all his teeth removed tomorrow and his wife said, he may have a head cold or sinus infection.  He hasn't had a fever, but she thinks he needs something because he is having Open Heart Surgery soon.  Asking, if you can call him something in for this or call them back?

## 2021-06-17 NOTE — TELEPHONE ENCOUNTER
Spoke to patient's wife.  Likely viral with described symptoms for the last couple of days.  Recommended over the counter Coricidin HBP if needed.  Sooner appointment for anxiety related to upcoming procedure.  Thanks, MARIELA Titus

## 2021-06-18 ENCOUNTER — TELEPHONE (OUTPATIENT)
Dept: FAMILY MEDICINE CLINIC | Facility: CLINIC | Age: 56
End: 2021-06-18

## 2021-06-18 DIAGNOSIS — Z83.2 FAMILY HISTORY OF CLOTTING DISORDER: Primary | ICD-10-CM

## 2021-06-18 NOTE — TELEPHONE ENCOUNTER
Please find out family's reasoning for request, is there a specific concern for factor 5 (family history, etc) or is this something the cardiologist wanted?  Thanks, MARIELA Titus

## 2021-06-29 ENCOUNTER — TELEPHONE (OUTPATIENT)
Dept: FAMILY MEDICINE CLINIC | Facility: CLINIC | Age: 56
End: 2021-06-29

## 2021-06-29 NOTE — TELEPHONE ENCOUNTER
Indira called and said Francis has been sick today, has only ate jello, and weak.  She wanted Dr Titus to call her and tell her what she needs to do.  I told her to take him to ER or urgent care to be evaluated, but she said have Dr Titus to call her.

## 2021-06-30 NOTE — TELEPHONE ENCOUNTER
Please call and speak with patient/patient's wife regarding symptoms.  If better, ok to monitor.  If still feeling weak, he should be seen right away (probably in ED) as he has known heart problem that can cause symptoms of weakness.  Thanks, MARIELA Titus

## 2021-06-30 NOTE — TELEPHONE ENCOUNTER
Francis is doing better-wife sayd she believes he had a stomach bug.    She wanted me to let you know that patient sees a oral surgeon next week.

## 2021-07-23 ENCOUNTER — LAB (OUTPATIENT)
Dept: LAB | Facility: OTHER | Age: 56
End: 2021-07-23

## 2021-07-23 DIAGNOSIS — Z83.2 FAMILY HISTORY OF CLOTTING DISORDER: ICD-10-CM

## 2021-07-23 PROCEDURE — 81241 F5 GENE: CPT | Performed by: FAMILY MEDICINE

## 2021-07-23 NOTE — TELEPHONE ENCOUNTER
"Pt Wife \"milly\" stated that the pt is going to be out of his medication on 7/24/21 (Lopressor). The pharmacy in closed on sundays. Pt Wife is asking if this can be done today 7/23/21 because pt can not go with his medication. Pt is having open heart surgery in the near future. Pt uses [facility] in [facility]     Pt wife state that the pharmacy did call over and left a refill request    Milly Whittaker  [phone]  "

## 2021-07-26 ENCOUNTER — TELEPHONE (OUTPATIENT)
Dept: FAMILY MEDICINE CLINIC | Facility: CLINIC | Age: 56
End: 2021-07-26

## 2021-07-26 LAB — F5 GENE MUT ANL BLD/T: NORMAL

## 2021-07-28 ENCOUNTER — TELEPHONE (OUTPATIENT)
Dept: FAMILY MEDICINE CLINIC | Facility: CLINIC | Age: 56
End: 2021-07-28

## 2021-07-28 NOTE — TELEPHONE ENCOUNTER
,   I have talked to Ms. Whittaker and Mr Panfilo is having Dental Surgery on 08/05/2021  He is needing a COVID PCR test performed no sooner than Saturday 07/31/2021.  I assume you will need to order it because they have already left the office and do not have an order.     The Dr in [place] will need the results faxed to them [facility].  If they do not have the test results the day before the surgery they will have to cancel and it will be another month before they can get him in.     I told her they would need to go to Urgent Care for testing.  They live in [place] so they could go there.     I also told them that they would need to self quarantine until his surgery so he does not pick something up after being tested.     She wants me to call them back and hopefully then she will have the fax number that it needs to be faxed to.     I will not be here on Tuesday 08/03/2021 to fax it to them.    JONATHAN Saeed

## 2021-07-29 NOTE — TELEPHONE ENCOUNTER
I have called the [facility].  At this time they are probably going to drive up to [place] to have the Covid Screen completed on Monday.     If they decide not to they will call us back.     Here is the fax number to the  Oral Surgery Department  [phone]    I have also faxed the Factor 5 results to both his Cardiovascular surgeon and the [facility] because she said they had not received these results.     I did get confirmation that the faxes went through, I have also mailed them a copy of the results to take to their Providers just incase the results do not make it to his chart in time for his up coming surgeries.     Dr. Bindu Muro Cardio fax number is [phone]    [facility], Fax number is [phone]    Incase these numbers are needed in the future.

## 2021-07-29 NOTE — TELEPHONE ENCOUNTER
If they go to have this done at urgent care on Saturday, order will be placed by that provider.  They will have to have an urgent care visit.  Only way we can order to have done there is if we have a visit and send them the same day.  Please get a fax number and Pastora or myself will send.  Ask that patient/patient's wife call us Monday 8/1/2021 for results and to have results faxed.  They can also request that the urgent care fax results if they take the fax number with them to this appointment.  ThanksMARIELA

## 2021-08-03 DIAGNOSIS — E11.65 TYPE 2 DIABETES MELLITUS WITH HYPERGLYCEMIA, WITHOUT LONG-TERM CURRENT USE OF INSULIN (HCC): Chronic | ICD-10-CM

## 2021-08-03 RX ORDER — INSULIN GLARGINE 100 [IU]/ML
INJECTION, SOLUTION SUBCUTANEOUS
Qty: 9 ML | Refills: 5 | Status: SHIPPED | OUTPATIENT
Start: 2021-08-03 | End: 2021-10-26 | Stop reason: SDUPTHER

## 2021-08-09 RX ORDER — APIXABAN 5 MG/1
TABLET, FILM COATED ORAL
Qty: 60 TABLET | Refills: 2 | Status: SHIPPED | OUTPATIENT
Start: 2021-08-09 | End: 2021-11-22

## 2021-09-01 RX ORDER — BLOOD SUGAR DIAGNOSTIC
STRIP MISCELLANEOUS
Qty: 200 EACH | Refills: 5 | Status: SHIPPED | OUTPATIENT
Start: 2021-09-01 | End: 2021-11-18 | Stop reason: SDUPTHER

## 2021-09-02 ENCOUNTER — TELEPHONE (OUTPATIENT)
Dept: FAMILY MEDICINE CLINIC | Facility: CLINIC | Age: 56
End: 2021-09-02

## 2021-09-02 DIAGNOSIS — F41.9 ANXIETY: ICD-10-CM

## 2021-09-02 RX ORDER — BUSPIRONE HYDROCHLORIDE 7.5 MG/1
TABLET ORAL
Qty: 60 TABLET | Refills: 2 | Status: SHIPPED | OUTPATIENT
Start: 2021-09-02 | End: 2021-11-18

## 2021-09-02 NOTE — TELEPHONE ENCOUNTER
Per Dr. Titus, Information has been relayed to MsHarpreet Panfilo.  I did stress the importance of having her  tested.   He has not bee vaccinated.

## 2021-09-02 NOTE — TELEPHONE ENCOUNTER
I would recommend OTC mucinex for his congestion.  Rest and fluids.  He should get COVID tested.  Thanks, MARIELA Titus

## 2021-09-02 NOTE — TELEPHONE ENCOUNTER
Patient is congested and they have family that is Positive with covid but does not sure if he has been exposed. Patient is wanting advice on his condition due to having open heart surgery. I request he go get tested from our urgent care but they are wanting to know if Ariela recommended anything over the counter for congestion. Patient thinks he has a head cold.    Thanks

## 2021-09-27 DIAGNOSIS — E11.65 TYPE 2 DIABETES MELLITUS WITH HYPERGLYCEMIA, WITHOUT LONG-TERM CURRENT USE OF INSULIN (HCC): ICD-10-CM

## 2021-09-27 RX ORDER — PEN NEEDLE, DIABETIC 32GX 5/32"
NEEDLE, DISPOSABLE MISCELLANEOUS
Qty: 200 EACH | Refills: 2 | Status: SHIPPED | OUTPATIENT
Start: 2021-09-27 | End: 2022-07-27 | Stop reason: SDUPTHER

## 2021-10-20 DIAGNOSIS — E11.65 TYPE 2 DIABETES MELLITUS WITH HYPERGLYCEMIA, WITHOUT LONG-TERM CURRENT USE OF INSULIN (HCC): Chronic | ICD-10-CM

## 2021-10-20 RX ORDER — INSULIN LISPRO 100 [IU]/ML
INJECTION, SOLUTION INTRAVENOUS; SUBCUTANEOUS
Qty: 9 ML | Refills: 2 | Status: SHIPPED | OUTPATIENT
Start: 2021-10-20 | End: 2021-10-26 | Stop reason: SDUPTHER

## 2021-10-26 ENCOUNTER — LAB (OUTPATIENT)
Dept: LAB | Facility: HOSPITAL | Age: 56
End: 2021-10-26

## 2021-10-26 ENCOUNTER — OFFICE VISIT (OUTPATIENT)
Dept: FAMILY MEDICINE CLINIC | Facility: CLINIC | Age: 56
End: 2021-10-26

## 2021-10-26 VITALS
DIASTOLIC BLOOD PRESSURE: 90 MMHG | HEART RATE: 84 BPM | BODY MASS INDEX: 29.39 KG/M2 | SYSTOLIC BLOOD PRESSURE: 130 MMHG | HEIGHT: 74 IN | WEIGHT: 229 LBS | OXYGEN SATURATION: 97 %

## 2021-10-26 DIAGNOSIS — F41.9 ANXIETY: ICD-10-CM

## 2021-10-26 DIAGNOSIS — I35.0 SEVERE AORTIC STENOSIS: ICD-10-CM

## 2021-10-26 DIAGNOSIS — E11.65 TYPE 2 DIABETES MELLITUS WITH HYPERGLYCEMIA, WITHOUT LONG-TERM CURRENT USE OF INSULIN (HCC): Chronic | ICD-10-CM

## 2021-10-26 DIAGNOSIS — E11.65 TYPE 2 DIABETES MELLITUS WITH HYPERGLYCEMIA, WITHOUT LONG-TERM CURRENT USE OF INSULIN (HCC): Primary | Chronic | ICD-10-CM

## 2021-10-26 PROCEDURE — 99214 OFFICE O/P EST MOD 30 MIN: CPT | Performed by: FAMILY MEDICINE

## 2021-10-26 PROCEDURE — 36415 COLL VENOUS BLD VENIPUNCTURE: CPT

## 2021-10-26 PROCEDURE — 80053 COMPREHEN METABOLIC PANEL: CPT

## 2021-10-26 PROCEDURE — 83036 HEMOGLOBIN GLYCOSYLATED A1C: CPT

## 2021-10-26 RX ORDER — CHLORHEXIDINE GLUCONATE 0.12 MG/ML
RINSE ORAL
COMMUNITY
Start: 2021-08-05

## 2021-10-26 RX ORDER — INSULIN GLARGINE 100 [IU]/ML
40 INJECTION, SOLUTION SUBCUTANEOUS NIGHTLY
Qty: 9 ML | Refills: 5 | Status: SHIPPED | OUTPATIENT
Start: 2021-10-26 | End: 2022-06-02 | Stop reason: SDUPTHER

## 2021-10-26 RX ORDER — INSULIN LISPRO 100 [IU]/ML
12 INJECTION, SOLUTION INTRAVENOUS; SUBCUTANEOUS
Qty: 12 ML | Refills: 2 | Status: SHIPPED | OUTPATIENT
Start: 2021-10-26 | End: 2022-01-25

## 2021-10-27 LAB
ALBUMIN SERPL-MCNC: 4.7 G/DL (ref 3.5–5.2)
ALBUMIN/GLOB SERPL: 1.6 G/DL
ALP SERPL-CCNC: 70 U/L (ref 39–117)
ALT SERPL W P-5'-P-CCNC: 41 U/L (ref 1–41)
ANION GAP SERPL CALCULATED.3IONS-SCNC: 9.6 MMOL/L (ref 5–15)
AST SERPL-CCNC: 26 U/L (ref 1–40)
BILIRUB SERPL-MCNC: 0.4 MG/DL (ref 0–1.2)
BUN SERPL-MCNC: 13 MG/DL (ref 6–20)
BUN/CREAT SERPL: 15.5 (ref 7–25)
CALCIUM SPEC-SCNC: 9.8 MG/DL (ref 8.6–10.5)
CHLORIDE SERPL-SCNC: 101 MMOL/L (ref 98–107)
CO2 SERPL-SCNC: 28.4 MMOL/L (ref 22–29)
CREAT SERPL-MCNC: 0.84 MG/DL (ref 0.76–1.27)
GFR SERPL CREATININE-BSD FRML MDRD: 95 ML/MIN/1.73
GLOBULIN UR ELPH-MCNC: 2.9 GM/DL
GLUCOSE SERPL-MCNC: 243 MG/DL (ref 65–99)
HBA1C MFR BLD: 8.37 % (ref 4.8–5.6)
POTASSIUM SERPL-SCNC: 4.3 MMOL/L (ref 3.5–5.2)
PROT SERPL-MCNC: 7.6 G/DL (ref 6–8.5)
SODIUM SERPL-SCNC: 139 MMOL/L (ref 136–145)

## 2021-10-28 ENCOUNTER — TELEPHONE (OUTPATIENT)
Dept: FAMILY MEDICINE CLINIC | Facility: CLINIC | Age: 56
End: 2021-10-28

## 2021-10-28 NOTE — PROGRESS NOTES
Per Dr. Titus, Ms. Whittaker has been called with recent lab results & recommendations.  Continue current medications and follow-up as planned or sooner if any problems.

## 2021-10-28 NOTE — TELEPHONE ENCOUNTER
Per Dr. Titus, Ms. Whittaker has been called with recent lab results & recommendations.  Continue current medications and follow-up as planned or sooner if any problems.       ----- Message from Caryn Titus MD sent at 10/28/2021  8:17 AM CDT -----  Please let patient know A1c has increased to 8.37% (up from 7.76%).  We made changes to his insulin and suggested diet changes at office visit, so this should help.  Thanks, MARIELA Titus

## 2021-10-29 ENCOUNTER — TELEPHONE (OUTPATIENT)
Dept: FAMILY MEDICINE CLINIC | Facility: CLINIC | Age: 56
End: 2021-10-29

## 2021-10-29 NOTE — TELEPHONE ENCOUNTER
Pt needs PA on Insulin Glargine (BASAGLAR KWIKPEN) 100 UNIT/ML injection pen    Please advise  [phone]    Uses [facility] in [facility].

## 2021-11-02 ENCOUNTER — TELEPHONE (OUTPATIENT)
Dept: FAMILY MEDICINE CLINIC | Facility: CLINIC | Age: 56
End: 2021-11-02

## 2021-11-02 NOTE — TELEPHONE ENCOUNTER
Blood sugar readings:    10/27/21   BF-210  L-174  D-125  BED-198    10/28/21  BF-202  L-161  D-136  BED-164    10/29/21  BF-186  L-165  D-143  BED-133    10/30/21  BF-184  L-139  D-120  BED-234-PT ATE A PC OF CAKE 1HR BEFORE    10/31/21  BF-146  L-106  D-109  BED-161    11/1/21  BF-158  L-117  D-109  BED-143    11/2/21  BF-156  L-163

## 2021-11-12 NOTE — TELEPHONE ENCOUNTER
More recent sugar doing ok.  Please ask that they call with report of blood glucose levels about a week after the surgery and we can discuss if medication adjustments need to be made.  Thanks, MARIELA Titus

## 2021-11-12 NOTE — TELEPHONE ENCOUNTER
Per Dr. Titus, Ms. Whittaker has been called with recommendations one he gets home from the hospital

## 2021-11-17 ENCOUNTER — TELEPHONE (OUTPATIENT)
Dept: FAMILY MEDICINE CLINIC | Facility: CLINIC | Age: 56
End: 2021-11-17

## 2021-11-17 DIAGNOSIS — M53.3 COCCYXDYNIA: Primary | ICD-10-CM

## 2021-11-17 NOTE — TELEPHONE ENCOUNTER
PATIENTS WIFE CALLED STATING AFSHAN IS VERY ANXIOUS/NERVOUS SINCE SURGERY, 11/11/2021 IT HAS ACTUALLY INCREASED TREMENDOUSLY, PATIENT IS HAVING CRYING EPISODES &  WANTS TO KNOW IF INCREASING MEDICATION WILL HELP? SHE WANTS A RETURN CALL PLEASE    THANKS

## 2021-11-18 ENCOUNTER — TELEPHONE (OUTPATIENT)
Dept: FAMILY MEDICINE CLINIC | Facility: CLINIC | Age: 56
End: 2021-11-18

## 2021-11-18 RX ORDER — BUSPIRONE HYDROCHLORIDE 10 MG/1
10 TABLET ORAL 3 TIMES DAILY
Qty: 90 TABLET | Refills: 1 | Status: SHIPPED | OUTPATIENT
Start: 2021-11-18 | End: 2022-01-21

## 2021-11-18 RX ORDER — BLOOD SUGAR DIAGNOSTIC
STRIP MISCELLANEOUS
Qty: 200 EACH | Refills: 5 | Status: SHIPPED | OUTPATIENT
Start: 2021-11-18 | End: 2023-01-03 | Stop reason: SDUPTHER

## 2021-11-18 NOTE — TELEPHONE ENCOUNTER
PATIENTS WIFE CALLED STATING THE PHARMACY SAID THERE IS A HOLD ON HIS PRESCRIPTION FOR TEST STRIPS, THEY SAID TO CALL THIS OFFICE TO GET APPROVAL.     IT IS AT [facility] IN [facility]      PATIENTS WIFE IS ALSO WANTING TO SPEAK TO SOMEONE ABOUT PATIENTS NERVES AND PAIN AROUND THE TAIL BONE FROM BEING IN THE HOSPITAL AND LAYING ON HIS BACK FOR LONG PERIODS OF TIME, HE WANTS A PRESCRIPTION FOR A CUSHION TO SET ON?    THANKS

## 2021-11-20 RX ORDER — LANCETS 30 GAUGE
EACH MISCELLANEOUS
Qty: 100 EACH | Refills: 5 | Status: SHIPPED | OUTPATIENT
Start: 2021-11-20 | End: 2022-04-12

## 2021-11-20 RX ORDER — BLOOD-GLUCOSE METER
KIT MISCELLANEOUS
Qty: 1 EACH | Refills: 0 | Status: SHIPPED | OUTPATIENT
Start: 2021-11-20 | End: 2023-01-03 | Stop reason: SDUPTHER

## 2021-11-21 DIAGNOSIS — F41.9 ANXIETY: ICD-10-CM

## 2021-11-22 ENCOUNTER — TELEPHONE (OUTPATIENT)
Dept: FAMILY MEDICINE CLINIC | Facility: CLINIC | Age: 56
End: 2021-11-22

## 2021-11-22 ENCOUNTER — OFFICE VISIT (OUTPATIENT)
Dept: FAMILY MEDICINE CLINIC | Facility: CLINIC | Age: 56
End: 2021-11-22

## 2021-11-22 VITALS
HEART RATE: 76 BPM | DIASTOLIC BLOOD PRESSURE: 82 MMHG | BODY MASS INDEX: 28.75 KG/M2 | SYSTOLIC BLOOD PRESSURE: 118 MMHG | HEIGHT: 74 IN | WEIGHT: 224 LBS

## 2021-11-22 DIAGNOSIS — Z95.3 STATUS POST AORTIC VALVE REPLACEMENT WITH PORCINE VALVE: ICD-10-CM

## 2021-11-22 DIAGNOSIS — F41.9 ANXIETY: ICD-10-CM

## 2021-11-22 DIAGNOSIS — I35.0 SEVERE AORTIC STENOSIS: Primary | ICD-10-CM

## 2021-11-22 DIAGNOSIS — E11.65 TYPE 2 DIABETES MELLITUS WITH HYPERGLYCEMIA, WITHOUT LONG-TERM CURRENT USE OF INSULIN (HCC): ICD-10-CM

## 2021-11-22 PROCEDURE — 99214 OFFICE O/P EST MOD 30 MIN: CPT | Performed by: FAMILY MEDICINE

## 2021-11-22 RX ORDER — LISINOPRIL 2.5 MG/1
2.5 TABLET ORAL DAILY
COMMUNITY
End: 2022-04-26

## 2021-11-22 RX ORDER — ATORVASTATIN CALCIUM 80 MG/1
80 TABLET, FILM COATED ORAL NIGHTLY
COMMUNITY
End: 2022-05-23 | Stop reason: SDUPTHER

## 2021-11-22 RX ORDER — BUSPIRONE HYDROCHLORIDE 7.5 MG/1
TABLET ORAL
Qty: 60 TABLET | Refills: 2 | OUTPATIENT
Start: 2021-11-22

## 2021-11-22 RX ORDER — APIXABAN 5 MG/1
TABLET, FILM COATED ORAL
Qty: 60 TABLET | Refills: 2 | Status: SHIPPED | OUTPATIENT
Start: 2021-11-22 | End: 2022-02-14

## 2021-11-22 RX ORDER — DOCUSATE SODIUM 100 MG/1
100 CAPSULE, LIQUID FILLED ORAL 2 TIMES DAILY
COMMUNITY

## 2021-11-22 NOTE — PROGRESS NOTES
"Chief Complaint  Transitional Care Management    Subjective          Francis Whittaker presents to [facility] - [place]  History of Present Illness    Patient presents today for hospital follow-up.  Recently had aortic valve replacement with porcine valve for severe aortic stenosis.  Patient is doing well postoperatively.  Doing breathing exercises with incentive spirometer.  Has some soreness at the chest wall.  No chest pain or shortness of breath.  Patient's anxiety symptoms have worsened.  Was taking BuSpar 7.5 mg 2-3 times daily, now increased to BuSpar 10 mg 3 times daily.  Patient continues to have more crying spells blood glucose levels have been doing good.  Fasting glucose between 120-150.  Daytime glucose varies with meals.  Not currently on any long-acting insulin.  Is currently taking Humalog 12 units 3 times daily with meals.    Objective   Vital Signs:   /82   Pulse 76   Ht 188 cm (74\")   Wt 102 kg (224 lb)   BMI 28.76 kg/m²     Physical Exam  Vitals reviewed.   Constitutional:       General: He is not in acute distress.     Appearance: He is well-developed.      Comments: Using walker to get around   Cardiovascular:      Rate and Rhythm: Normal rate and regular rhythm.      Heart sounds: No murmur heard.      Pulmonary:      Effort: Pulmonary effort is normal. No respiratory distress.      Breath sounds: Normal breath sounds. No wheezing.   Chest:      Comments: Midline incision clean dry and intact.  Smaller abdominal incisions clean dry and intact  Abdominal:      Palpations: Abdomen is soft.      Tenderness: There is no abdominal tenderness.   Skin:     General: Skin is warm and dry.   Neurological:      Mental Status: He is alert and oriented to person, place, and time.   Psychiatric:         Mood and Affect: Affect is tearful.        Result Review :   The following data was reviewed by: Caryn Titus MD on 11/22/2021:  Common labs  "   Common Labsle 10/26/21 10/26/21    1041 1041   Glucose  243 (A)   BUN  13   Creatinine  0.84   eGFR Non African Am  95   Sodium  139   Potassium  4.3   Chloride  101   Calcium  9.8   Albumin  4.70   Total Bilirubin  0.4   Alkaline Phosphatase  70   AST (SGOT)  26   ALT (SGPT)  41   Hemoglobin A1C 8.37 (A)    Microalbumin, Urine     (A) Abnormal value                      Assessment and Plan    Diagnoses and all orders for this visit:    1. Severe aortic stenosis (Primary)    2. Status post aortic valve replacement with porcine valve    3. Type 2 diabetes mellitus with hyperglycemia, without long-term current use of insulin (HCC)    4. Anxiety      Patient seen today for follow-up  Status post aortic valve replacement with porcine valve for severe aortic stenosis  Asymptomatic, skin incision healing well  Encouraged continued breathing exercises  Patient should continue to use walker for mobility during recovery phase    Type 2 diabetes has been controlled  We will make adjustments to insulin regimen today  Take Basaglar 10 units at bedtime  Use sliding scale with meals as below:     Under 150: 0 units   150-200: 4 units   200-250: 6 units   250-300: 8 units   Over 300: 10 units    Patient will call with blood glucose levels in 2 weeks so that further adjustments could be made  Encourage following diabetic diet    Anxiety symptoms not well controlled, BuSpar recently adjusted  We will continue to monitor symptoms  Option of adding SSRI discussed, can consider if needed      Follow Up   Return in about 6 weeks (around 1/3/2022) for Recheck.  Patient was given instructions and counseling regarding his condition or for health maintenance advice. Please see specific information pulled into the AVS if appropriate.         This document has been electronically signed by Caryn Titus MD

## 2021-11-22 NOTE — TELEPHONE ENCOUNTER
Indira called and said she forgot to tell Dr Titus that they cannot get Francis's test strips filled with insurance, has to have a PA done.  He uses her meter for now.  She said they are Accu check strips.

## 2021-11-22 NOTE — PROGRESS NOTES
"Chief Complaint  Diabetes    Subjective          Francis Whittaker presents to [facility] [place]  History of Present Illness    Patient presents today for follow-up.  Notes that blood glucose levels have been running high.  Patient is not doing well following a diabetic diet.  Eating too many carbs.  Patient reports that he \"does not get full\" and feels hungry all of the time.  Patient is currently taking Basaglar 35 units at bedtime and Humalog 10 units 3 times daily with meals.  Has had tooth extraction in preparation for surgery for aortic stenosis.  Patient will have this surgery done in [facility].  Patient's anxiety better controlled with BuSpar 7.5 mg twice daily.      Objective   Vital Signs:   /90   Pulse 84   Ht 188 cm (74\")   Wt 104 kg (229 lb)   SpO2 97%   BMI 29.40 kg/m²     Physical Exam  Vitals reviewed.   Constitutional:       General: He is not in acute distress.     Appearance: He is well-developed.   Cardiovascular:      Rate and Rhythm: Normal rate and regular rhythm.      Heart sounds: No murmur heard.      Pulmonary:      Effort: Pulmonary effort is normal. No respiratory distress.      Breath sounds: Normal breath sounds. No wheezing.   Abdominal:      Palpations: Abdomen is soft.      Tenderness: There is no abdominal tenderness.   Skin:     General: Skin is warm and dry.      Findings: No rash.   Neurological:      Mental Status: He is alert and oriented to person, place, and time.        Result Review :   The following data was reviewed by: Caryn Titus MD on 10/26/2021:  Common labs    Common Labsle 1/4/21 5/3/21 5/3/21    1210 1206 1206   Glucose  231 (A)    BUN  15    Creatinine  0.75 (A)    eGFR Non African Am  108    Sodium  139    Potassium  4.5    Chloride  103    Calcium  9.6    Albumin  4.60    Total Bilirubin  0.3    Alkaline Phosphatase  63    AST (SGOT)  21    ALT (SGPT)  47 (A)    Hemoglobin A1C   7.76 (A) "   Microalbumin, Urine 10.7                    Assessment and Plan    Diagnoses and all orders for this visit:    1. Type 2 diabetes mellitus with hyperglycemia, without long-term current use of insulin (HCC) (Primary)  -     Insulin Glargine (BASAGLAR KWIKPEN) 100 UNIT/ML injection pen; Inject 40 Units under the skin into the appropriate area as directed Every Night for 90 days.  Dispense: 9 mL; Refill: 5  -     Insulin Lispro, 1 Unit Dial, (HUMALOG) 100 UNIT/ML solution pen-injector; Inject 12 Units under the skin into the appropriate area as directed 3 (Three) Times a Day With Meals for 90 days.  Dispense: 12 mL; Refill: 2  -     Hemoglobin A1c; Future  -     Comprehensive Metabolic Panel; Future    2. Severe aortic stenosis    3. Anxiety      Based on home glucose readings, type 2 diabetes not well controlled  Increase Basaglar to 40 units at bedtime  Increase Humalog to 12 units 3 times daily with meals  Stressed the importance of following a diabetic diet  Will recheck hemoglobin A1c and CMP  Patient preparing for surgery for severe aortic stenosis, asymptomatic  Patient has preoperative work-up in [place] prior to procedure  Anxiety symptoms improved with BuSpar 7.5 mg twice daily, continue        Follow Up   Return in about 4 weeks (around 11/23/2021) for Recheck.  Patient was given instructions and counseling regarding his condition or for health maintenance advice. Please see specific information pulled into the AVS if appropriate.         This document has been electronically signed by Caryn Titus MD

## 2021-11-23 NOTE — TELEPHONE ENCOUNTER
Patient's meds and glucose supplies are at pharmacy waiting to be picked up. Called patient wife and let her know.

## 2021-12-13 ENCOUNTER — TELEPHONE (OUTPATIENT)
Dept: FAMILY MEDICINE CLINIC | Facility: CLINIC | Age: 56
End: 2021-12-13

## 2021-12-13 NOTE — TELEPHONE ENCOUNTER
Please ask that patient increase basal insulin to 15 units at night.  Check for 2 weeks then call or bring log to the office.  If he has concerns about his glucose meter working, may consider checking with  or pharmacy on how to calibrate it.  ThanksMARIELA

## 2021-12-28 ENCOUNTER — TELEPHONE (OUTPATIENT)
Dept: FAMILY MEDICINE CLINIC | Facility: CLINIC | Age: 56
End: 2021-12-28

## 2021-12-28 NOTE — TELEPHONE ENCOUNTER
[facility] BLOOD SUGAR READINGS:  12/14/21  232-AM  210-LUNCH  254-SUPPER  228-BED TIME  12/15/21  228-AM   197-LUNCH   279-SUPPER   230-BED   12/16/21  198-AM  233-LUNCH  280-SUPPER  301-BED  12/17/21  198  228  270  311  12/18/21  265  218  277  224  12/19/21  253  246  229  193  12/20/21  223  224  283  266  12/21/21  256  289  237  256  12/22/21  202  289  270  228  12/23/21  259  228  182  331  12/24/21  243  240  168  229  12/25/21  260  241  259  264  12/26/21  267  270  294  393  12/27/21  270  246  232  323  12/28/21  208

## 2021-12-29 NOTE — TELEPHONE ENCOUNTER
Per Dr. Titus, Ms. Whittaker has been called with recent recommendations on increasing his insulin.     Patient's wife states understanding

## 2021-12-29 NOTE — TELEPHONE ENCOUNTER
Blood glucose levels are too high. Please recommend increase in basal insulin to 20 units at bedtime, increase to 25 if still in the 200s after 1 week.  Really important that patient watches carbs in his diet.  Thanks, MARIELA Titus

## 2022-01-03 ENCOUNTER — OFFICE VISIT (OUTPATIENT)
Dept: FAMILY MEDICINE CLINIC | Facility: CLINIC | Age: 57
End: 2022-01-03

## 2022-01-03 VITALS
BODY MASS INDEX: 28.49 KG/M2 | SYSTOLIC BLOOD PRESSURE: 120 MMHG | OXYGEN SATURATION: 100 % | DIASTOLIC BLOOD PRESSURE: 80 MMHG | HEART RATE: 72 BPM | HEIGHT: 74 IN | WEIGHT: 222 LBS

## 2022-01-03 DIAGNOSIS — Z95.3 STATUS POST AORTIC VALVE REPLACEMENT WITH PORCINE VALVE: ICD-10-CM

## 2022-01-03 DIAGNOSIS — Z79.4 TYPE 2 DIABETES MELLITUS WITH HYPERGLYCEMIA, WITH LONG-TERM CURRENT USE OF INSULIN: Primary | ICD-10-CM

## 2022-01-03 DIAGNOSIS — F41.9 ANXIETY: ICD-10-CM

## 2022-01-03 DIAGNOSIS — E11.65 TYPE 2 DIABETES MELLITUS WITH HYPERGLYCEMIA, WITH LONG-TERM CURRENT USE OF INSULIN: Primary | ICD-10-CM

## 2022-01-03 PROCEDURE — 99214 OFFICE O/P EST MOD 30 MIN: CPT | Performed by: FAMILY MEDICINE

## 2022-01-03 RX ORDER — DULAGLUTIDE 0.75 MG/.5ML
0.75 INJECTION, SOLUTION SUBCUTANEOUS WEEKLY
Qty: 1 PEN | Refills: 1 | Status: SHIPPED | OUTPATIENT
Start: 2022-01-03 | End: 2022-02-28

## 2022-01-03 NOTE — PROGRESS NOTES
"Chief Complaint  Diabetes    Subjective          Francis Whittaker presents to [facility] [place]  History of Present Illness    Patient seen today for follow up diabetes.  Blood glucose levels not well controlled.  Patient has been making efforts to follow diabetes diet, but still eating more carbs with meals than he knows he should.  He has been taking Basaglar 25 units at bedtime.      Patient is status post aortic valve replacement, and has started cardiac rehab.  He is overall feeling good.    Anxiety controlled with Buspar 10 mg 2-3 times daily.      Objective   Vital Signs:   /80   Pulse 72   Ht 188 cm (74\")   Wt 101 kg (222 lb)   SpO2 100%   BMI 28.50 kg/m²     Physical Exam  Vitals reviewed.   Constitutional:       General: He is not in acute distress.     Appearance: He is well-developed.   Cardiovascular:      Rate and Rhythm: Normal rate and regular rhythm.   Pulmonary:      Effort: Pulmonary effort is normal. No respiratory distress.      Breath sounds: Normal breath sounds. No wheezing.   Abdominal:      Palpations: Abdomen is soft.      Tenderness: There is no abdominal tenderness.   Skin:     General: Skin is warm and dry.   Neurological:      Mental Status: He is alert and oriented to person, place, and time.        Result Review :   The following data was reviewed by: Caryn Titus MD on 01/03/2022:  Common labs    Common Labsle 5/3/21 5/3/21 10/26/21 10/26/21    1206 1206 1041 1041   Glucose 231 (A)   243 (A)   BUN 15   13   Creatinine 0.75 (A)   0.84   eGFR Non African Am 108   95   Sodium 139   139   Potassium 4.5   4.3   Chloride 103   101   Calcium 9.6   9.8   Albumin 4.60   4.70   Total Bilirubin 0.3   0.4   Alkaline Phosphatase 63   70   AST (SGOT) 21   26   ALT (SGPT) 47 (A)   41   Hemoglobin A1C  7.76 (A) 8.37 (A)    (A) Abnormal value                      Assessment and Plan    Diagnoses and all orders for this visit:    1. Type " 2 diabetes mellitus with hyperglycemia, with long-term current use of insulin (HCC) (Primary)  -     Dulaglutide (Trulicity) 0.75 MG/0.5ML solution pen-injector; Inject 0.75 mg under the skin into the appropriate area as directed 1 (One) Time Per Week.  Dispense: 1 pen; Refill: 1    2. Status post aortic valve replacement with porcine valve    3. Anxiety      Diabetes not well controlled  Stressed the importance of diabetic diet  Will start Trulicity 0.75 mg weekly  Risks and benefits of this medication discussed  Keep Basaglar the same for now    Continue with cardiac rehab - status post TAVR    Anxiety controlled with Buspar, continue          Follow Up   Return in about 3 months (around 4/3/2022) for Recheck.  Patient was given instructions and counseling regarding his condition or for health maintenance advice. Please see specific information pulled into the AVS if appropriate.         This document has been electronically signed by Caryn Titus MD

## 2022-01-11 ENCOUNTER — TELEPHONE (OUTPATIENT)
Dept: FAMILY MEDICINE CLINIC | Facility: CLINIC | Age: 57
End: 2022-01-11

## 2022-01-11 DIAGNOSIS — Z95.3 STATUS POST AORTIC VALVE REPLACEMENT WITH PORCINE VALVE: Primary | ICD-10-CM

## 2022-01-11 NOTE — TELEPHONE ENCOUNTER
Wants to know if you can send in a prescription for a blood pressure machine to [facility] in [place].

## 2022-01-12 RX ORDER — ADHESIVE BANDAGE 3/4"
BANDAGE TOPICAL
Qty: 1 EACH | Refills: 0 | Status: SHIPPED | OUTPATIENT
Start: 2022-01-12

## 2022-01-21 RX ORDER — BUSPIRONE HYDROCHLORIDE 10 MG/1
TABLET ORAL
Qty: 90 TABLET | Refills: 1 | Status: SHIPPED | OUTPATIENT
Start: 2022-01-21 | End: 2022-03-21

## 2022-01-25 DIAGNOSIS — E11.65 TYPE 2 DIABETES MELLITUS WITH HYPERGLYCEMIA, WITHOUT LONG-TERM CURRENT USE OF INSULIN: Chronic | ICD-10-CM

## 2022-01-25 RX ORDER — INSULIN LISPRO 100 [IU]/ML
INJECTION, SOLUTION INTRAVENOUS; SUBCUTANEOUS
Qty: 9 ML | Refills: 6 | Status: SHIPPED | OUTPATIENT
Start: 2022-01-25 | End: 2022-04-26

## 2022-01-27 ENCOUNTER — TELEPHONE (OUTPATIENT)
Dept: FAMILY MEDICINE CLINIC | Facility: CLINIC | Age: 57
End: 2022-01-27

## 2022-01-27 NOTE — TELEPHONE ENCOUNTER
Yes,  He has been taking his Trulicity and took his injection last night and has contacted his pharmacy for a refill.  She will let us know if it needs to be changed to Ozempic.      He will use what ever his Insurance will pay for.   She states his appetite is back and he is staying hungry constantly.

## 2022-01-27 NOTE — TELEPHONE ENCOUNTER
Please find out if he has started Trulicity yet?  Ok to switch to Ozempic.  Side effects are essentially the same.   Thanks, MARIELA Titus   Include Pregnancy/Lactation Warning?: No

## 2022-01-28 NOTE — TELEPHONE ENCOUNTER
Needs to watch diet, low carb is very important.  Go ahead and increase Basaglar to 30 units for now.  Thanks, MARIELA Titus

## 2022-02-14 RX ORDER — APIXABAN 5 MG/1
TABLET, FILM COATED ORAL
Qty: 60 TABLET | Refills: 2 | Status: SHIPPED | OUTPATIENT
Start: 2022-02-14 | End: 2022-04-26

## 2022-02-26 DIAGNOSIS — E11.65 TYPE 2 DIABETES MELLITUS WITH HYPERGLYCEMIA, WITH LONG-TERM CURRENT USE OF INSULIN: ICD-10-CM

## 2022-02-26 DIAGNOSIS — Z79.4 TYPE 2 DIABETES MELLITUS WITH HYPERGLYCEMIA, WITH LONG-TERM CURRENT USE OF INSULIN: ICD-10-CM

## 2022-02-28 ENCOUNTER — TELEPHONE (OUTPATIENT)
Dept: FAMILY MEDICINE CLINIC | Facility: CLINIC | Age: 57
End: 2022-02-28

## 2022-02-28 RX ORDER — DULAGLUTIDE 0.75 MG/.5ML
INJECTION, SOLUTION SUBCUTANEOUS
Qty: 2 ML | Refills: 1 | Status: SHIPPED | OUTPATIENT
Start: 2022-02-28 | End: 2022-03-01

## 2022-03-01 ENCOUNTER — TELEPHONE (OUTPATIENT)
Dept: FAMILY MEDICINE CLINIC | Facility: CLINIC | Age: 57
End: 2022-03-01

## 2022-03-01 NOTE — TELEPHONE ENCOUNTER
Patients wife called stating the pharmacy said they haven't received a PA on   Trulicity 0.75 MG/0.5ML solution pen-injector. She said his Blood Sugar is high and they are worried. She would like someone to call her back    Thanks

## 2022-03-09 ENCOUNTER — TELEPHONE (OUTPATIENT)
Dept: FAMILY MEDICINE CLINIC | Facility: CLINIC | Age: 57
End: 2022-03-09

## 2022-03-09 NOTE — TELEPHONE ENCOUNTER
Pt's wife Indira called regarding disability papers that need to be filled out for pt by Ariela.  Mari call her back when you are able to, thanks.

## 2022-03-09 NOTE — TELEPHONE ENCOUNTER
She is needing a Handi [employer] form for parking.    Do you have the Forms?  She said he got his disability and he is still getting out of breath when he has to walk long distances     I told her we would call them when it was ready to

## 2022-03-10 NOTE — TELEPHONE ENCOUNTER
Would you please get one of these (handicap pass) started for this patient.  I will sign tomorrow.  Let them know they can  tomorrow.  Thanks, MARIELA Titus

## 2022-03-21 RX ORDER — BUSPIRONE HYDROCHLORIDE 10 MG/1
TABLET ORAL
Qty: 90 TABLET | Refills: 1 | Status: SHIPPED | OUTPATIENT
Start: 2022-03-21 | End: 2022-05-20

## 2022-03-28 RX ORDER — SEMAGLUTIDE 1.34 MG/ML
INJECTION, SOLUTION SUBCUTANEOUS
Qty: 1.5 ML | Refills: 0 | Status: SHIPPED | OUTPATIENT
Start: 2022-03-28 | End: 2022-04-26 | Stop reason: SDUPTHER

## 2022-03-29 ENCOUNTER — TELEPHONE (OUTPATIENT)
Dept: FAMILY MEDICINE CLINIC | Facility: CLINIC | Age: 57
End: 2022-03-29

## 2022-03-29 NOTE — TELEPHONE ENCOUNTER
Patients wife called wanting to move his appointment from 4/4/2022 to the following week, there isn't a spot open that next week, the next opening isn't until the last week of the month, they want to know if Dr. Titus thinks it would be ok for him to wait until the end of the month to be seen.    Please Advise    Thanks

## 2022-04-12 RX ORDER — LANCETS 28 GAUGE
EACH MISCELLANEOUS
Qty: 100 EACH | Refills: 5 | Status: SHIPPED | OUTPATIENT
Start: 2022-04-12 | End: 2022-09-29

## 2022-04-20 RX ORDER — BLOOD SUGAR DIAGNOSTIC
STRIP MISCELLANEOUS
Qty: 120 EACH | Refills: 2 | Status: SHIPPED | OUTPATIENT
Start: 2022-04-20 | End: 2022-06-30 | Stop reason: SDUPTHER

## 2022-04-26 ENCOUNTER — OFFICE VISIT (OUTPATIENT)
Dept: FAMILY MEDICINE CLINIC | Facility: CLINIC | Age: 57
End: 2022-04-26

## 2022-04-26 VITALS
WEIGHT: 229 LBS | BODY MASS INDEX: 29.39 KG/M2 | HEART RATE: 92 BPM | OXYGEN SATURATION: 97 % | SYSTOLIC BLOOD PRESSURE: 98 MMHG | DIASTOLIC BLOOD PRESSURE: 70 MMHG | HEIGHT: 74 IN

## 2022-04-26 DIAGNOSIS — E11.65 TYPE 2 DIABETES MELLITUS WITH HYPERGLYCEMIA, WITHOUT LONG-TERM CURRENT USE OF INSULIN: Chronic | ICD-10-CM

## 2022-04-26 PROCEDURE — 99213 OFFICE O/P EST LOW 20 MIN: CPT | Performed by: FAMILY MEDICINE

## 2022-04-26 RX ORDER — SEMAGLUTIDE 1.34 MG/ML
INJECTION, SOLUTION SUBCUTANEOUS
COMMUNITY
End: 2022-04-26 | Stop reason: SDUPTHER

## 2022-04-26 RX ORDER — SEMAGLUTIDE 1.34 MG/ML
0.5 INJECTION, SOLUTION SUBCUTANEOUS WEEKLY
Qty: 1 PEN | Refills: 2 | Status: SHIPPED | OUTPATIENT
Start: 2022-04-26 | End: 2022-07-13 | Stop reason: SDUPTHER

## 2022-04-26 RX ORDER — SACUBITRIL AND VALSARTAN 24; 26 MG/1; MG/1
1 TABLET, FILM COATED ORAL 2 TIMES DAILY
COMMUNITY
Start: 2022-02-09

## 2022-04-26 RX ORDER — METOPROLOL TARTRATE 50 MG/1
50 TABLET, FILM COATED ORAL 3 TIMES DAILY
COMMUNITY

## 2022-04-27 ENCOUNTER — TELEPHONE (OUTPATIENT)
Dept: FAMILY MEDICINE CLINIC | Facility: CLINIC | Age: 57
End: 2022-04-27

## 2022-04-27 NOTE — TELEPHONE ENCOUNTER
Patients wife Indira called back stating they contacted Francis's heart doctor and he told her that his symptoms does not have anything to do with his heart and he needed to be seen by his PCP.   Darlin stated she wasn't sure what to do but if it would be better for Dr. Titus, they could do a video visit or make an appointment. She said they was going to do his lab work in the morning.     Francis has had no fever, has not eaten since breakfast this morning, which he vomited after eating. He has just been weak and not feeling well.     Please advise @ [phone] (Android)

## 2022-04-27 NOTE — TELEPHONE ENCOUNTER
Pt's wife  is concerned had open heart surgery in November and  Legs really weak , threw up this morning and was doing ok yesterday at appt but throat was sore late yesterday and wife is really concerned [phone] needs a call as soon as possible

## 2022-04-27 NOTE — TELEPHONE ENCOUNTER
Spoke with patient's wife.  Vomiting episode this morning, none since.  Some leg weakness.  No diarrhea.  Blood pressure, heart rate and glucose are all within normal range.  Patient has tolerated fluids and chicken noodle soup today.  Is feeling slightly better.  Advised continued monitoring of symptoms.  Could be something he ate or viral illness.  Recommendations for when patient needs to be seen in the ER discussed.   Arnaldo Titus

## 2022-04-28 ENCOUNTER — TELEPHONE (OUTPATIENT)
Dept: FAMILY MEDICINE CLINIC | Facility: CLINIC | Age: 57
End: 2022-04-28

## 2022-04-28 NOTE — TELEPHONE ENCOUNTER
Ms. Whittaker has been called and OTC medication information has been relayed.     Dr. Titus  She said they did want to let you know that he is coughing up Thick Green junk.   I also recommended that he increase his fluid intake to help with the thick phlegm

## 2022-04-28 NOTE — TELEPHONE ENCOUNTER
WIFE STATES PT IS NOT FEELING WORSE, BUT NOT BETTER. SHE STATES HE IS COUGHING UP A THICK, GREEN PHLEGM. DOES HE NEED TO TAKE SOMETHING FOR THAT?

## 2022-04-28 NOTE — TELEPHONE ENCOUNTER
Ok to try some mucinex for a few days (maybe 5 days) and Claritin as well if they think allergy symptoms.  Thanks, MARIELA garcia

## 2022-05-01 PROBLEM — I35.0 AORTIC STENOSIS: Status: ACTIVE | Noted: 2020-06-23

## 2022-05-01 PROBLEM — Z95.2 HISTORY OF AORTIC VALVE REPLACEMENT: Status: ACTIVE | Noted: 2022-02-07

## 2022-05-01 PROBLEM — I72.8: Status: ACTIVE | Noted: 2022-02-07

## 2022-05-01 PROBLEM — K02.9 DENTAL CARIES: Status: ACTIVE | Noted: 2021-07-30

## 2022-05-01 PROBLEM — F41.9 ANXIETY: Status: ACTIVE | Noted: 2021-11-12

## 2022-05-01 PROBLEM — I10 ESSENTIAL HYPERTENSION: Status: ACTIVE | Noted: 2022-02-07

## 2022-05-01 PROBLEM — I48.0 PAROXYSMAL ATRIAL FIBRILLATION: Status: ACTIVE | Noted: 2022-02-07

## 2022-05-01 PROBLEM — E78.00 HIGH CHOLESTEROL: Status: ACTIVE | Noted: 2021-11-05

## 2022-05-01 PROBLEM — Z86.79 HISTORY OF SUSTAINED VENTRICULAR TACHYCARDIA: Status: ACTIVE | Noted: 2022-02-07

## 2022-05-01 PROBLEM — I25.2 HISTORY OF MYOCARDIAL INFARCTION: Status: ACTIVE | Noted: 2022-02-07

## 2022-05-01 PROBLEM — I50.9 CONGESTIVE HEART FAILURE: Status: ACTIVE | Noted: 2022-02-07

## 2022-05-01 PROBLEM — Z98.890 HISTORY OF MAZE PROCEDURE: Status: ACTIVE | Noted: 2022-02-07

## 2022-05-01 PROBLEM — K21.9 GASTROESOPHAGEAL REFLUX DISEASE: Status: ACTIVE | Noted: 2021-11-05

## 2022-05-01 PROBLEM — I48.91 A-FIB: Status: ACTIVE | Noted: 2020-02-09

## 2022-05-20 RX ORDER — APIXABAN 5 MG/1
TABLET, FILM COATED ORAL
Qty: 60 TABLET | Refills: 2 | OUTPATIENT
Start: 2022-05-20

## 2022-05-20 RX ORDER — BUSPIRONE HYDROCHLORIDE 10 MG/1
TABLET ORAL
Qty: 90 TABLET | Refills: 1 | Status: SHIPPED | OUTPATIENT
Start: 2022-05-20 | End: 2022-07-18 | Stop reason: SDUPTHER

## 2022-05-23 ENCOUNTER — LAB (OUTPATIENT)
Dept: LAB | Facility: OTHER | Age: 57
End: 2022-05-23

## 2022-05-23 DIAGNOSIS — E11.65 TYPE 2 DIABETES MELLITUS WITH HYPERGLYCEMIA, WITHOUT LONG-TERM CURRENT USE OF INSULIN: ICD-10-CM

## 2022-05-23 LAB
ALBUMIN SERPL-MCNC: 4.4 G/DL (ref 3.5–5)
ALBUMIN UR-MCNC: 14.6 MG/DL
ALBUMIN/GLOB SERPL: 1.4 G/DL (ref 1.1–1.8)
ALP SERPL-CCNC: 68 U/L (ref 38–126)
ALT SERPL W P-5'-P-CCNC: 61 U/L
ANION GAP SERPL CALCULATED.3IONS-SCNC: 8 MMOL/L (ref 5–15)
AST SERPL-CCNC: 36 U/L (ref 17–59)
BASOPHILS # BLD AUTO: 0.05 10*3/MM3 (ref 0–0.2)
BASOPHILS NFR BLD AUTO: 0.6 % (ref 0–1.5)
BILIRUB SERPL-MCNC: 0.7 MG/DL (ref 0.2–1.3)
BUN SERPL-MCNC: 12 MG/DL (ref 7–23)
BUN/CREAT SERPL: 18.2 (ref 7–25)
CALCIUM SPEC-SCNC: 9.3 MG/DL (ref 8.4–10.2)
CHLORIDE SERPL-SCNC: 105 MMOL/L (ref 101–112)
CO2 SERPL-SCNC: 29 MMOL/L (ref 22–30)
CREAT SERPL-MCNC: 0.66 MG/DL (ref 0.7–1.3)
CREAT UR-MCNC: 123.7 MG/DL
DEPRECATED RDW RBC AUTO: 42.5 FL (ref 37–54)
EGFRCR SERPLBLD CKD-EPI 2021: 110.1 ML/MIN/1.73
EOSINOPHIL # BLD AUTO: 0.2 10*3/MM3 (ref 0–0.4)
EOSINOPHIL NFR BLD AUTO: 2.5 % (ref 0.3–6.2)
ERYTHROCYTE [DISTWIDTH] IN BLOOD BY AUTOMATED COUNT: 13.6 % (ref 12.3–15.4)
GLOBULIN UR ELPH-MCNC: 3.1 GM/DL (ref 2.3–3.5)
GLUCOSE SERPL-MCNC: 237 MG/DL (ref 70–99)
HBA1C MFR BLD: 9.4 % (ref 4.8–5.6)
HCT VFR BLD AUTO: 45 % (ref 37.5–51)
HGB BLD-MCNC: 15.7 G/DL (ref 13–17.7)
LYMPHOCYTES # BLD AUTO: 2.91 10*3/MM3 (ref 0.7–3.1)
LYMPHOCYTES NFR BLD AUTO: 36.5 % (ref 19.6–45.3)
MCH RBC QN AUTO: 30.4 PG (ref 26.6–33)
MCHC RBC AUTO-ENTMCNC: 34.9 G/DL (ref 31.5–35.7)
MCV RBC AUTO: 87.2 FL (ref 79–97)
MICROALBUMIN/CREAT UR: 118 MG/G
MONOCYTES # BLD AUTO: 0.67 10*3/MM3 (ref 0.1–0.9)
MONOCYTES NFR BLD AUTO: 8.4 % (ref 5–12)
NEUTROPHILS NFR BLD AUTO: 4.14 10*3/MM3 (ref 1.7–7)
NEUTROPHILS NFR BLD AUTO: 52 % (ref 42.7–76)
PLATELET # BLD AUTO: 189 10*3/MM3 (ref 140–450)
PMV BLD AUTO: 9.5 FL (ref 6–12)
POTASSIUM SERPL-SCNC: 4.4 MMOL/L (ref 3.4–5)
PROT SERPL-MCNC: 7.5 G/DL (ref 6.3–8.6)
RBC # BLD AUTO: 5.16 10*6/MM3 (ref 4.14–5.8)
SODIUM SERPL-SCNC: 142 MMOL/L (ref 137–145)
WBC NRBC COR # BLD: 7.97 10*3/MM3 (ref 3.4–10.8)

## 2022-05-23 PROCEDURE — 82570 ASSAY OF URINE CREATININE: CPT | Performed by: FAMILY MEDICINE

## 2022-05-23 PROCEDURE — 85025 COMPLETE CBC W/AUTO DIFF WBC: CPT | Performed by: FAMILY MEDICINE

## 2022-05-23 PROCEDURE — 83036 HEMOGLOBIN GLYCOSYLATED A1C: CPT | Performed by: FAMILY MEDICINE

## 2022-05-23 PROCEDURE — 82043 UR ALBUMIN QUANTITATIVE: CPT | Performed by: FAMILY MEDICINE

## 2022-05-23 PROCEDURE — 80053 COMPREHEN METABOLIC PANEL: CPT | Performed by: FAMILY MEDICINE

## 2022-05-23 RX ORDER — ATORVASTATIN CALCIUM 80 MG/1
80 TABLET, FILM COATED ORAL NIGHTLY
Qty: 90 TABLET | Refills: 3 | Status: SHIPPED | OUTPATIENT
Start: 2022-05-23

## 2022-05-23 NOTE — TELEPHONE ENCOUNTER
PT'S PHARMACY SENT REFILL REQUESTS THAT WERE DENIED BECAUSE HE HADNT COMPLETED HIS LABS. PT'S WIFE CALLED TO INFORM THAT HE HAS COMPLETED THEM AND IS ASKING FOR THE MEDS TO BE FILLED

## 2022-05-25 ENCOUNTER — TELEPHONE (OUTPATIENT)
Dept: FAMILY MEDICINE CLINIC | Facility: CLINIC | Age: 57
End: 2022-05-25

## 2022-05-25 NOTE — TELEPHONE ENCOUNTER
Per Dr. Titus, Ms. Whittaker has been called with recent lab results & recommendations.  Continue current medications and follow-up as planned or sooner if any problems.       ----- Message from Caryn Titus MD sent at 5/25/2022 12:55 AM CDT -----  HgbA1c has increased significantly.  Continue plan as discussed in office with medication change.  Very important that patient watch the amount of sugar/carbs in his diet.  Thanks, MARIELA Titus

## 2022-06-02 DIAGNOSIS — E11.65 TYPE 2 DIABETES MELLITUS WITH HYPERGLYCEMIA, WITHOUT LONG-TERM CURRENT USE OF INSULIN: Chronic | ICD-10-CM

## 2022-06-02 RX ORDER — INSULIN GLARGINE 100 [IU]/ML
40 INJECTION, SOLUTION SUBCUTANEOUS NIGHTLY
Qty: 9 ML | Refills: 5 | Status: SHIPPED | OUTPATIENT
Start: 2022-06-02 | End: 2022-06-28 | Stop reason: SDUPTHER

## 2022-06-28 ENCOUNTER — TELEPHONE (OUTPATIENT)
Dept: FAMILY MEDICINE CLINIC | Facility: CLINIC | Age: 57
End: 2022-06-28

## 2022-06-28 DIAGNOSIS — E11.65 TYPE 2 DIABETES MELLITUS WITH HYPERGLYCEMIA, WITHOUT LONG-TERM CURRENT USE OF INSULIN: Chronic | ICD-10-CM

## 2022-06-28 RX ORDER — INSULIN GLARGINE 100 [IU]/ML
40 INJECTION, SOLUTION SUBCUTANEOUS NIGHTLY
Qty: 9 ML | Refills: 5 | Status: SHIPPED | OUTPATIENT
Start: 2022-06-28 | End: 2022-06-29 | Stop reason: CLARIF

## 2022-06-28 NOTE — TELEPHONE ENCOUNTER
Insurance formulary is Lantus instead of Basaglar.      Is thus ok to change?    Please advise    JONATHAN Saeed

## 2022-06-29 ENCOUNTER — OFFICE VISIT (OUTPATIENT)
Dept: FAMILY MEDICINE CLINIC | Facility: CLINIC | Age: 57
End: 2022-06-29

## 2022-06-29 VITALS
WEIGHT: 223 LBS | HEIGHT: 73 IN | OXYGEN SATURATION: 97 % | HEART RATE: 90 BPM | DIASTOLIC BLOOD PRESSURE: 60 MMHG | SYSTOLIC BLOOD PRESSURE: 110 MMHG | BODY MASS INDEX: 29.55 KG/M2

## 2022-06-29 DIAGNOSIS — Z79.4 TYPE 2 DIABETES MELLITUS WITH HYPERGLYCEMIA, WITH LONG-TERM CURRENT USE OF INSULIN: Primary | ICD-10-CM

## 2022-06-29 DIAGNOSIS — E11.65 TYPE 2 DIABETES MELLITUS WITH HYPERGLYCEMIA, WITH LONG-TERM CURRENT USE OF INSULIN: Primary | ICD-10-CM

## 2022-06-29 DIAGNOSIS — K21.9 GASTROESOPHAGEAL REFLUX DISEASE, UNSPECIFIED WHETHER ESOPHAGITIS PRESENT: ICD-10-CM

## 2022-06-29 DIAGNOSIS — F41.9 ANXIETY: ICD-10-CM

## 2022-06-29 PROCEDURE — 99214 OFFICE O/P EST MOD 30 MIN: CPT | Performed by: FAMILY MEDICINE

## 2022-06-29 RX ORDER — FAMOTIDINE 20 MG/1
20 TABLET, FILM COATED ORAL 2 TIMES DAILY PRN
Qty: 60 TABLET | Refills: 2 | Status: SHIPPED | OUTPATIENT
Start: 2022-06-29 | End: 2022-10-28

## 2022-07-13 DIAGNOSIS — E11.65 TYPE 2 DIABETES MELLITUS WITH HYPERGLYCEMIA, WITHOUT LONG-TERM CURRENT USE OF INSULIN: Chronic | ICD-10-CM

## 2022-07-13 RX ORDER — SEMAGLUTIDE 1.34 MG/ML
0.5 INJECTION, SOLUTION SUBCUTANEOUS WEEKLY
Qty: 1 PEN | Refills: 2 | Status: SHIPPED | OUTPATIENT
Start: 2022-07-13 | End: 2022-08-29

## 2022-07-18 RX ORDER — BUSPIRONE HYDROCHLORIDE 10 MG/1
10 TABLET ORAL 3 TIMES DAILY
Qty: 90 TABLET | Refills: 3 | Status: SHIPPED | OUTPATIENT
Start: 2022-07-18 | End: 2023-02-17

## 2022-07-19 ENCOUNTER — TELEPHONE (OUTPATIENT)
Dept: FAMILY MEDICINE CLINIC | Facility: CLINIC | Age: 57
End: 2022-07-19

## 2022-07-19 DIAGNOSIS — U07.1 COVID-19 VIRUS INFECTION: Primary | ICD-10-CM

## 2022-07-19 NOTE — TELEPHONE ENCOUNTER
Pt had heart valve replaced in Nov and has just tested positive is there something he can take or do .[phone]    [facility]

## 2022-07-20 RX ORDER — GUAIFENESIN 600 MG/1
600 TABLET, EXTENDED RELEASE ORAL 2 TIMES DAILY
Qty: 20 TABLET | Refills: 0 | Status: SHIPPED | OUTPATIENT
Start: 2022-07-20 | End: 2022-07-30

## 2022-07-20 RX ORDER — BENZONATATE 100 MG/1
100 CAPSULE ORAL 3 TIMES DAILY PRN
Qty: 30 CAPSULE | Refills: 0 | Status: SHIPPED | OUTPATIENT
Start: 2022-07-20 | End: 2022-07-30

## 2022-07-20 NOTE — TELEPHONE ENCOUNTER
Can use over the counter muncinex, nasal saline, tylenol as needed for symptoms.  I will send prescription for mucinex and cough medication.  If patient would like to explore COVID specific treatment options, like Paxlovid or infusions - recommend that he contact the [facility] line to set up appointment and discuss treatment options he may be eligible for.  The phone number to call to set up virtual visit is [phone].  Thanks, MARIELA Titus

## 2022-07-27 DIAGNOSIS — E11.65 TYPE 2 DIABETES MELLITUS WITH HYPERGLYCEMIA, WITHOUT LONG-TERM CURRENT USE OF INSULIN: ICD-10-CM

## 2022-07-27 RX ORDER — PEN NEEDLE, DIABETIC 32GX 5/32"
NEEDLE, DISPOSABLE MISCELLANEOUS
Qty: 200 EACH | Refills: 2 | Status: SHIPPED | OUTPATIENT
Start: 2022-07-27 | End: 2022-11-14 | Stop reason: SDUPTHER

## 2022-07-27 NOTE — TELEPHONE ENCOUNTER
Incoming Refill Request      Medication requested (name and dose):4 mm pen needles    Pharmacy where request should be sent: [facility] [place]    Additional details provided by patient: patient had called the pharmacy to order these, but said, they were never ordered    Best call back number: [phone]    Does the patient have less than a 3 day supply:  [x] Yes  [] No    Gisela Tipton  07/27/22, 10:42 CDT

## 2022-08-29 ENCOUNTER — LAB (OUTPATIENT)
Dept: LAB | Facility: HOSPITAL | Age: 57
End: 2022-08-29

## 2022-08-29 ENCOUNTER — OFFICE VISIT (OUTPATIENT)
Dept: FAMILY MEDICINE CLINIC | Facility: CLINIC | Age: 57
End: 2022-08-29

## 2022-08-29 VITALS
SYSTOLIC BLOOD PRESSURE: 102 MMHG | DIASTOLIC BLOOD PRESSURE: 80 MMHG | HEART RATE: 88 BPM | OXYGEN SATURATION: 97 % | WEIGHT: 227 LBS | BODY MASS INDEX: 30.09 KG/M2 | HEIGHT: 73 IN

## 2022-08-29 DIAGNOSIS — E11.65 TYPE 2 DIABETES MELLITUS WITH HYPERGLYCEMIA, WITHOUT LONG-TERM CURRENT USE OF INSULIN: ICD-10-CM

## 2022-08-29 DIAGNOSIS — E11.65 TYPE 2 DIABETES MELLITUS WITH HYPERGLYCEMIA, WITHOUT LONG-TERM CURRENT USE OF INSULIN: Primary | ICD-10-CM

## 2022-08-29 DIAGNOSIS — U07.1 COVID-19 VIRUS INFECTION: ICD-10-CM

## 2022-08-29 DIAGNOSIS — F41.9 ANXIETY: ICD-10-CM

## 2022-08-29 PROCEDURE — 36415 COLL VENOUS BLD VENIPUNCTURE: CPT

## 2022-08-29 PROCEDURE — 99214 OFFICE O/P EST MOD 30 MIN: CPT | Performed by: FAMILY MEDICINE

## 2022-08-29 PROCEDURE — 80053 COMPREHEN METABOLIC PANEL: CPT

## 2022-08-29 PROCEDURE — 83036 HEMOGLOBIN GLYCOSYLATED A1C: CPT

## 2022-08-30 LAB
ALBUMIN SERPL-MCNC: 4.2 G/DL (ref 3.5–5.2)
ALBUMIN/GLOB SERPL: 1.6 G/DL
ALP SERPL-CCNC: 63 U/L (ref 39–117)
ALT SERPL W P-5'-P-CCNC: 48 U/L (ref 1–41)
ANION GAP SERPL CALCULATED.3IONS-SCNC: 5.7 MMOL/L (ref 5–15)
AST SERPL-CCNC: 29 U/L (ref 1–40)
BILIRUB SERPL-MCNC: 0.5 MG/DL (ref 0–1.2)
BUN SERPL-MCNC: 10 MG/DL (ref 6–20)
BUN/CREAT SERPL: 12.2 (ref 7–25)
CALCIUM SPEC-SCNC: 9.1 MG/DL (ref 8.6–10.5)
CHLORIDE SERPL-SCNC: 104 MMOL/L (ref 98–107)
CO2 SERPL-SCNC: 28.3 MMOL/L (ref 22–29)
CREAT SERPL-MCNC: 0.82 MG/DL (ref 0.76–1.27)
EGFRCR SERPLBLD CKD-EPI 2021: 103.1 ML/MIN/1.73
GLOBULIN UR ELPH-MCNC: 2.7 GM/DL
GLUCOSE SERPL-MCNC: 291 MG/DL (ref 65–99)
HBA1C MFR BLD: 9.3 % (ref 4.8–5.6)
POTASSIUM SERPL-SCNC: 4.7 MMOL/L (ref 3.5–5.2)
PROT SERPL-MCNC: 6.9 G/DL (ref 6–8.5)
SODIUM SERPL-SCNC: 138 MMOL/L (ref 136–145)

## 2022-08-31 ENCOUNTER — TELEPHONE (OUTPATIENT)
Dept: FAMILY MEDICINE CLINIC | Facility: CLINIC | Age: 57
End: 2022-08-31

## 2022-09-29 RX ORDER — BLOOD SUGAR DIAGNOSTIC
STRIP MISCELLANEOUS
Qty: 100 EACH | Refills: 5 | Status: SHIPPED | OUTPATIENT
Start: 2022-09-29 | End: 2023-01-03

## 2022-09-29 RX ORDER — LANCETS 28 GAUGE
EACH MISCELLANEOUS
Qty: 100 EACH | Refills: 5 | Status: SHIPPED | OUTPATIENT
Start: 2022-09-29 | End: 2023-01-03 | Stop reason: SDUPTHER

## 2022-10-28 DIAGNOSIS — K21.9 GASTROESOPHAGEAL REFLUX DISEASE, UNSPECIFIED WHETHER ESOPHAGITIS PRESENT: ICD-10-CM

## 2022-10-28 RX ORDER — FAMOTIDINE 20 MG/1
TABLET, FILM COATED ORAL
Qty: 60 TABLET | Refills: 2 | Status: SHIPPED | OUTPATIENT
Start: 2022-10-28 | End: 2023-02-06

## 2022-11-14 DIAGNOSIS — E11.65 TYPE 2 DIABETES MELLITUS WITH HYPERGLYCEMIA, WITHOUT LONG-TERM CURRENT USE OF INSULIN: ICD-10-CM

## 2022-11-14 RX ORDER — PEN NEEDLE, DIABETIC 32GX 5/32"
NEEDLE, DISPOSABLE MISCELLANEOUS
Qty: 200 EACH | Refills: 2 | Status: SHIPPED | OUTPATIENT
Start: 2022-11-14 | End: 2022-11-29

## 2022-11-14 NOTE — TELEPHONE ENCOUNTER
Incoming Refill Request      Medication requested (name and dose): INSULIN PEN NEEDLE 4MM    Ph[facility] where [facility] be sent[facility]    Additional details provided by patient: PT CALLED, NEEDING A REFILL OF HIS INSULIN NEEDLES. HE ALSO REQUESTS THAT THE QUANTITY OF NEEDLES BE INCREASED BECAUSE IT SOMETIMES TAKES MORE THAN ONE OR TWO NEEDLES TO ADMINISTER HIS INSULIN, LEAVING HIM SHORT FOR THE MONTH    Best call back number: [phone]    Does the patient have less than a 3 day supply:  [x] Yes  [] No    Amelia Go, [facility] Rep  11/14/22, 10:54 CST

## 2022-11-29 ENCOUNTER — LAB (OUTPATIENT)
Dept: LAB | Facility: HOSPITAL | Age: 57
End: 2022-11-29

## 2022-11-29 ENCOUNTER — OFFICE VISIT (OUTPATIENT)
Dept: FAMILY MEDICINE CLINIC | Facility: CLINIC | Age: 57
End: 2022-11-29

## 2022-11-29 VITALS
DIASTOLIC BLOOD PRESSURE: 78 MMHG | HEIGHT: 73 IN | OXYGEN SATURATION: 96 % | HEART RATE: 92 BPM | WEIGHT: 226 LBS | BODY MASS INDEX: 29.95 KG/M2 | SYSTOLIC BLOOD PRESSURE: 110 MMHG

## 2022-11-29 DIAGNOSIS — E11.65 TYPE 2 DIABETES MELLITUS WITH HYPERGLYCEMIA, WITHOUT LONG-TERM CURRENT USE OF INSULIN: Primary | ICD-10-CM

## 2022-11-29 DIAGNOSIS — E11.65 TYPE 2 DIABETES MELLITUS WITH HYPERGLYCEMIA, WITHOUT LONG-TERM CURRENT USE OF INSULIN: ICD-10-CM

## 2022-11-29 DIAGNOSIS — F41.9 ANXIETY: ICD-10-CM

## 2022-11-29 LAB
BASOPHILS # BLD AUTO: 0.09 10*3/MM3 (ref 0–0.2)
BASOPHILS NFR BLD AUTO: 0.8 % (ref 0–1.5)
DEPRECATED RDW RBC AUTO: 39 FL (ref 37–54)
EOSINOPHIL # BLD AUTO: 0.26 10*3/MM3 (ref 0–0.4)
EOSINOPHIL NFR BLD AUTO: 2.4 % (ref 0.3–6.2)
ERYTHROCYTE [DISTWIDTH] IN BLOOD BY AUTOMATED COUNT: 12.3 % (ref 12.3–15.4)
HBA1C MFR BLD: 7.1 % (ref 4.8–5.6)
HCT VFR BLD AUTO: 44.1 % (ref 37.5–51)
HGB BLD-MCNC: 15 G/DL (ref 13–17.7)
IMM GRANULOCYTES # BLD AUTO: 0.2 10*3/MM3 (ref 0–0.05)
IMM GRANULOCYTES NFR BLD AUTO: 1.9 % (ref 0–0.5)
LYMPHOCYTES # BLD AUTO: 2.83 10*3/MM3 (ref 0.7–3.1)
LYMPHOCYTES NFR BLD AUTO: 26.3 % (ref 19.6–45.3)
MCH RBC QN AUTO: 30 PG (ref 26.6–33)
MCHC RBC AUTO-ENTMCNC: 34 G/DL (ref 31.5–35.7)
MCV RBC AUTO: 88.2 FL (ref 79–97)
MONOCYTES # BLD AUTO: 0.61 10*3/MM3 (ref 0.1–0.9)
MONOCYTES NFR BLD AUTO: 5.7 % (ref 5–12)
NEUTROPHILS NFR BLD AUTO: 6.76 10*3/MM3 (ref 1.7–7)
NEUTROPHILS NFR BLD AUTO: 62.9 % (ref 42.7–76)
NRBC BLD AUTO-RTO: 0 /100 WBC (ref 0–0.2)
PLATELET # BLD AUTO: 266 10*3/MM3 (ref 140–450)
PMV BLD AUTO: 10 FL (ref 6–12)
RBC # BLD AUTO: 5 10*6/MM3 (ref 4.14–5.8)
WBC NRBC COR # BLD: 10.75 10*3/MM3 (ref 3.4–10.8)

## 2022-11-29 PROCEDURE — 83036 HEMOGLOBIN GLYCOSYLATED A1C: CPT

## 2022-11-29 PROCEDURE — 36415 COLL VENOUS BLD VENIPUNCTURE: CPT

## 2022-11-29 PROCEDURE — 80053 COMPREHEN METABOLIC PANEL: CPT

## 2022-11-29 PROCEDURE — 85025 COMPLETE CBC W/AUTO DIFF WBC: CPT

## 2022-11-29 PROCEDURE — 99214 OFFICE O/P EST MOD 30 MIN: CPT | Performed by: FAMILY MEDICINE

## 2022-11-29 RX ORDER — SERTRALINE HYDROCHLORIDE 25 MG/1
25 TABLET, FILM COATED ORAL DAILY
Qty: 30 TABLET | Refills: 5 | Status: SHIPPED | OUTPATIENT
Start: 2022-11-29

## 2022-11-29 NOTE — PROGRESS NOTES
Chief Complaint  Diabetes    Subjective    History of Present Illness {CC  Problem List  Visit  Diagnosis   Encounters  Notes  Medications  Labs  Result Review Imaging  Media :23}     Francis Whittaker presents to [facility] [place] for     Chief Complaint   Patient presents with   • Diabetes      Patient seen today for follow-up.  Notes that blood glucose levels have been bouncing all around.  Bedtime readings are usually the highest readings of the day.  He brings blood glucose log with him to the office today.  Checking with each meal.  Patient does not always eat lunch.  Says that he tried to walk with his wife few times after visit, but felt like he could not do it.  No regular exercise at this time.  Foods that he eats are not always low-carb/low calorie.  Goes through spells where he has trouble feeling full.  Patient's anxiety is still not well controlled.  Notes that at nights when he lays down he has lots of thoughts.  No suicidal ideation.  Has been using BuSpar 10 mg 3 times a day, which helps some.  He would like to see a psychologist.      Current Outpatient Medications:   •  Accu-Chek Guide test strip, USE TO CHECK BLOOD SUGAR FOUR TIMES DAILY, Disp: 200 each, Rfl: 5  •  ASPIRIN 81 PO, Take  by mouth Daily., Disp: , Rfl:   •  atorvastatin (LIPITOR) 80 MG tablet, Take 1 tablet by mouth Every Night., Disp: 90 tablet, Rfl: 3  •  Blood Pressure Monitoring (Blood Pressure Cuff) misc, Use to check blood pressure once daily and as needed., Disp: 1 each, Rfl: 0  •  busPIRone (BUSPAR) 10 MG tablet, Take 1 tablet by mouth 3 (Three) Times a Day., Disp: 90 tablet, Rfl: 3  •  chlorhexidine (PERIDEX) 0.12 % solution, , Disp: , Rfl:   •  docusate sodium (COLACE) 100 MG capsule, Take 100 mg by mouth 2 (Two) Times a Day., Disp: , Rfl:   •  famotidine (PEPCID) 20 MG tablet, TAKE 1 TABLET BY MOUTH TWICE DAILY AS NEEDED FOR HEARTBURN, Disp: 60 tablet, Rfl: 2  •   "Fexofenadine HCl (MUCINEX ALLERGY PO), Take 1 tablet by mouth Daily., Disp: , Rfl:   •  FreeStyle Precision Nii Test test strip, USE FOUR TIMES DAILY TO CHECK BLOOD GLUCOSE, Disp: 100 each, Rfl: 5  •  glucose monitor monitoring kit, Use to check blood glucose 4 times daily., Disp: 1 each, Rfl: 0  •  Insulin Glargine (LANTUS SOLOSTAR) 100 UNIT/ML injection pen, Inject 40 Units under the skin into the appropriate area as directed Every Night., Disp: 12 mL, Rfl: 3  •  Insulin Pen Needle (BD Pen Needle Tosin 2nd Gen) 32G X 4 MM misc, USE AS DIRECTED TO CHECK BLOOD SUGAR LEVELS, Disp: 200 each, Rfl: 2  •  L-THEANINE PO, Take 400 mg by mouth 2 (Two) Times a Day., Disp: , Rfl:   •  Lancets (freestyle) lancets, TEST FOUR TIMES DAILY, Disp: 100 each, Rfl: 5  •  Loratadine (CLARITIN PO), Take  by mouth Daily., Disp: , Rfl:   •  MAGnesium-Oxide 400 (241.3 Mg) MG tablet tablet, Take 400 mg by mouth every night at bedtime., Disp: , Rfl:   •  metoprolol tartrate (LOPRESSOR) 50 MG tablet, Take 50 mg by mouth 3 (Three) Times a Day., Disp: , Rfl:   •  sacubitril-valsartan (Entresto) 24-26 MG tablet, Take 1 tablet by mouth 2 (Two) Times a Day., Disp: , Rfl:   •  Semaglutide, 1 MG/DOSE, (OZEMPIC) 2 MG/1.5ML solution pen-injector, Inject 1 mg under the skin into the appropriate area as directed 1 (One) Time Per Week., Disp: 1.5 mL, Rfl: 2  •  verapamil SR (CALAN-SR) 120 MG CR tablet, Take  by mouth., Disp: , Rfl:      Objective       Vital Signs:   /78   Pulse 92   Ht 185.4 cm (73\")   Wt 103 kg (226 lb)   SpO2 96%   BMI 29.82 kg/m²     Physical Exam  Vitals reviewed.   Constitutional:       General: He is not in acute distress.     Appearance: He is well-developed.   Cardiovascular:      Rate and Rhythm: Normal rate and regular rhythm.      Heart sounds: No murmur heard.  Pulmonary:      Effort: Pulmonary effort is normal. No respiratory distress.      Breath sounds: Normal breath sounds. No wheezing.   Skin:     General: " Skin is warm and dry.   Neurological:      Mental Status: He is alert and oriented to person, place, and time.        Result Review :{ Labs  Result Review  Imaging  Med Tab  Media :23}   The following data was reviewed by: Caryn Titus MD on 11/29/2022    Common labs    Common Labs 8/29/22 8/29/22    1426 1426   Glucose  291 (A)   Glucose     BUN  10   Creatinine  0.82   eGFR Non African Am     eGFR African Am     Sodium  138   Potassium  4.7   Chloride  104   Calcium  9.1   Albumin  4.20   Total Bilirubin  0.5   Alkaline Phosphatase  63   AST (SGOT)  29   ALT (SGPT)  48 (A)   WBC     Hemoglobin     Hematocrit     Platelets     Hemoglobin A1C 9.30 (A)    Microalbumin, Urine     (A) Abnormal value       Comments are available for some flowsheets but are not being displayed.                   Assessment and Plan {CC Problem List  Visit Diagnosis  ROS  Review (Popup)  [facility] Maintenance  Quality  BestPractice  Medications  SmartSets  SnapShot Encounters  Media :23}   Diagnoses and all orders for this visit:    1. Type 2 diabetes mellitus with hyperglycemia, without long-term current use of insulin (HCC) (Primary)  -     CBC & Differential; Future  -     Comprehensive Metabolic Panel; Future  -     Hemoglobin A1c; Future  -     Insulin Pen Needle 31G X 6 MM misc; Use with insulin nightly  Dispense: 100 each; Refill: 3    2. Anxiety  -     sertraline (Zoloft) 25 MG tablet; Take 1 tablet by mouth Daily.  Dispense: 30 tablet; Refill: 5  -     Ambulatory Referral to Psychology       Type 2 diabetes has not been well controlled  Still looks like he is having bedtime hyperglycemia  Encouraged patient to lower carbs at dinner meal  Continue with Lantus 35 units at night and Ozempic 1 mg weekly  Continue monitoring her glucose levels  Encourage patient to increase exercise tolerance gradually  Will change size of insulin needles to help with administration    Anxiety not well controlled  Continue  BuSpar  Add Zoloft 25 mg daily  Referral to psychology for counseling      Follow Up {Instructions Charge Capture  Follow-up Communications :23}   Return in about 3 months (around 2/28/2023) for Recheck.  Patient was given instructions and counseling regarding his condition or for health maintenance advice. Please see specific information pulled into the AVS if appropriate.            This document has been electronically signed by Caryn Titus MD

## 2022-11-30 LAB
ALBUMIN SERPL-MCNC: 4.4 G/DL (ref 3.5–5.2)
ALBUMIN/GLOB SERPL: 1.5 G/DL
ALP SERPL-CCNC: 82 U/L (ref 39–117)
ALT SERPL W P-5'-P-CCNC: 84 U/L (ref 1–41)
ANION GAP SERPL CALCULATED.3IONS-SCNC: 10.3 MMOL/L (ref 5–15)
AST SERPL-CCNC: 49 U/L (ref 1–40)
BILIRUB SERPL-MCNC: 0.5 MG/DL (ref 0–1.2)
BUN SERPL-MCNC: 11 MG/DL (ref 6–20)
BUN/CREAT SERPL: 12.8 (ref 7–25)
CALCIUM SPEC-SCNC: 9.5 MG/DL (ref 8.6–10.5)
CHLORIDE SERPL-SCNC: 101 MMOL/L (ref 98–107)
CO2 SERPL-SCNC: 25.7 MMOL/L (ref 22–29)
CREAT SERPL-MCNC: 0.86 MG/DL (ref 0.76–1.27)
EGFRCR SERPLBLD CKD-EPI 2021: 101.6 ML/MIN/1.73
GLOBULIN UR ELPH-MCNC: 3 GM/DL
GLUCOSE SERPL-MCNC: 231 MG/DL (ref 65–99)
POTASSIUM SERPL-SCNC: 4.7 MMOL/L (ref 3.5–5.2)
PROT SERPL-MCNC: 7.4 G/DL (ref 6–8.5)
SODIUM SERPL-SCNC: 137 MMOL/L (ref 136–145)

## 2022-12-01 DIAGNOSIS — E11.65 TYPE 2 DIABETES MELLITUS WITH HYPERGLYCEMIA, WITHOUT LONG-TERM CURRENT USE OF INSULIN: ICD-10-CM

## 2022-12-01 RX ORDER — SEMAGLUTIDE 1.34 MG/ML
INJECTION, SOLUTION SUBCUTANEOUS
Qty: 3 ML | Refills: 3 | Status: SHIPPED | OUTPATIENT
Start: 2022-12-01

## 2022-12-05 ENCOUNTER — TELEPHONE (OUTPATIENT)
Dept: FAMILY MEDICINE CLINIC | Facility: CLINIC | Age: 57
End: 2022-12-05

## 2022-12-05 NOTE — TELEPHONE ENCOUNTER
Per Dr. Titus, Ms. Whittaker  has been called with recent lab results & recommendations.  Continue current medications and follow-up as planned or sooner if any problems.       ----- Message from Caryn Titus MD sent at 12/2/2022  1:34 AM CST -----  HgbA1c better at 7.1%, which is pretty good.  Mild abnormalities on CBC and with liver enzymes.  Expect these will be back to normal on next check, will check at next visit.  Thanks, MARIELA Titus

## 2022-12-30 ENCOUNTER — TELEPHONE (OUTPATIENT)
Dept: FAMILY MEDICINE CLINIC | Facility: CLINIC | Age: 57
End: 2022-12-30
Payer: COMMERCIAL

## 2022-12-30 NOTE — TELEPHONE ENCOUNTER
Patients wife called stating the patients sugar monitor is not working and she was wanting to know if Jaleesa could fill out the paperwork for his as well. She said the paperwork was so the insurance would pay for the monitor. Please advise [phone]    JOHN BERNARD

## 2023-01-03 RX ORDER — BLOOD-GLUCOSE METER
KIT MISCELLANEOUS
Qty: 1 EACH | Refills: 0 | Status: SHIPPED | OUTPATIENT
Start: 2023-01-03

## 2023-01-03 RX ORDER — LANCETS 28 GAUGE
EACH MISCELLANEOUS
Qty: 100 EACH | Refills: 5 | Status: SHIPPED | OUTPATIENT
Start: 2023-01-03

## 2023-01-03 RX ORDER — BLOOD SUGAR DIAGNOSTIC
STRIP MISCELLANEOUS
Qty: 200 EACH | Refills: 5 | Status: SHIPPED | OUTPATIENT
Start: 2023-01-03 | End: 2023-03-31 | Stop reason: SDUPTHER

## 2023-01-03 NOTE — TELEPHONE ENCOUNTER
I sent all the supplies this morning.  Please check with pharmacy to see if they got all of this?  Thanks, MARIELA Titus

## 2023-02-05 DIAGNOSIS — K21.9 GASTROESOPHAGEAL REFLUX DISEASE, UNSPECIFIED WHETHER ESOPHAGITIS PRESENT: ICD-10-CM

## 2023-02-06 RX ORDER — FAMOTIDINE 20 MG/1
TABLET, FILM COATED ORAL
Qty: 60 TABLET | Refills: 2 | Status: SHIPPED | OUTPATIENT
Start: 2023-02-06

## 2023-02-17 RX ORDER — BUSPIRONE HYDROCHLORIDE 10 MG/1
TABLET ORAL
Qty: 90 TABLET | Refills: 3 | Status: SHIPPED | OUTPATIENT
Start: 2023-02-17

## 2023-02-24 RX ORDER — INSULIN GLARGINE 100 [IU]/ML
INJECTION, SOLUTION SUBCUTANEOUS
Qty: 12 ML | Refills: 3 | Status: SHIPPED | OUTPATIENT
Start: 2023-02-24

## 2023-03-01 ENCOUNTER — LAB (OUTPATIENT)
Dept: LAB | Facility: HOSPITAL | Age: 58
End: 2023-03-01
Payer: COMMERCIAL

## 2023-03-01 ENCOUNTER — OFFICE VISIT (OUTPATIENT)
Dept: FAMILY MEDICINE CLINIC | Facility: CLINIC | Age: 58
End: 2023-03-01
Payer: COMMERCIAL

## 2023-03-01 VITALS
SYSTOLIC BLOOD PRESSURE: 100 MMHG | OXYGEN SATURATION: 97 % | BODY MASS INDEX: 30.35 KG/M2 | WEIGHT: 229 LBS | HEIGHT: 73 IN | DIASTOLIC BLOOD PRESSURE: 80 MMHG | HEART RATE: 89 BPM

## 2023-03-01 DIAGNOSIS — E11.65 TYPE 2 DIABETES MELLITUS WITH HYPERGLYCEMIA, WITHOUT LONG-TERM CURRENT USE OF INSULIN: Primary | ICD-10-CM

## 2023-03-01 DIAGNOSIS — E11.65 TYPE 2 DIABETES MELLITUS WITH HYPERGLYCEMIA, WITHOUT LONG-TERM CURRENT USE OF INSULIN: ICD-10-CM

## 2023-03-01 LAB
ALBUMIN SERPL-MCNC: 4.5 G/DL (ref 3.5–5.2)
ALBUMIN/GLOB SERPL: 1.6 G/DL
ALP SERPL-CCNC: 74 U/L (ref 39–117)
ALT SERPL W P-5'-P-CCNC: 53 U/L (ref 1–41)
ANION GAP SERPL CALCULATED.3IONS-SCNC: 10.5 MMOL/L (ref 5–15)
AST SERPL-CCNC: 37 U/L (ref 1–40)
BASOPHILS # BLD AUTO: 0.07 10*3/MM3 (ref 0–0.2)
BASOPHILS NFR BLD AUTO: 0.8 % (ref 0–1.5)
BILIRUB SERPL-MCNC: 0.8 MG/DL (ref 0–1.2)
BUN SERPL-MCNC: 15 MG/DL (ref 6–20)
BUN/CREAT SERPL: 18.1 (ref 7–25)
CALCIUM SPEC-SCNC: 9.1 MG/DL (ref 8.6–10.5)
CHLORIDE SERPL-SCNC: 104 MMOL/L (ref 98–107)
CO2 SERPL-SCNC: 23.5 MMOL/L (ref 22–29)
CREAT SERPL-MCNC: 0.83 MG/DL (ref 0.76–1.27)
DEPRECATED RDW RBC AUTO: 39.6 FL (ref 37–54)
EGFRCR SERPLBLD CKD-EPI 2021: 102.7 ML/MIN/1.73
EOSINOPHIL # BLD AUTO: 0.2 10*3/MM3 (ref 0–0.4)
EOSINOPHIL NFR BLD AUTO: 2.2 % (ref 0.3–6.2)
ERYTHROCYTE [DISTWIDTH] IN BLOOD BY AUTOMATED COUNT: 13 % (ref 12.3–15.4)
GLOBULIN UR ELPH-MCNC: 2.9 GM/DL
GLUCOSE SERPL-MCNC: 223 MG/DL (ref 65–99)
HBA1C MFR BLD: 8.9 % (ref 4.8–5.6)
HCT VFR BLD AUTO: 46.7 % (ref 37.5–51)
HGB BLD-MCNC: 15.6 G/DL (ref 13–17.7)
IMM GRANULOCYTES # BLD AUTO: 0.08 10*3/MM3 (ref 0–0.05)
IMM GRANULOCYTES NFR BLD AUTO: 0.9 % (ref 0–0.5)
LYMPHOCYTES # BLD AUTO: 2.78 10*3/MM3 (ref 0.7–3.1)
LYMPHOCYTES NFR BLD AUTO: 30.9 % (ref 19.6–45.3)
MCH RBC QN AUTO: 28.9 PG (ref 26.6–33)
MCHC RBC AUTO-ENTMCNC: 33.4 G/DL (ref 31.5–35.7)
MCV RBC AUTO: 86.6 FL (ref 79–97)
MONOCYTES # BLD AUTO: 0.75 10*3/MM3 (ref 0.1–0.9)
MONOCYTES NFR BLD AUTO: 8.3 % (ref 5–12)
NEUTROPHILS NFR BLD AUTO: 5.12 10*3/MM3 (ref 1.7–7)
NEUTROPHILS NFR BLD AUTO: 56.9 % (ref 42.7–76)
NRBC BLD AUTO-RTO: 0 /100 WBC (ref 0–0.2)
PLATELET # BLD AUTO: 205 10*3/MM3 (ref 140–450)
PMV BLD AUTO: 10.6 FL (ref 6–12)
POTASSIUM SERPL-SCNC: 4.3 MMOL/L (ref 3.5–5.2)
PROT SERPL-MCNC: 7.4 G/DL (ref 6–8.5)
RBC # BLD AUTO: 5.39 10*6/MM3 (ref 4.14–5.8)
SODIUM SERPL-SCNC: 138 MMOL/L (ref 136–145)
WBC NRBC COR # BLD: 9 10*3/MM3 (ref 3.4–10.8)

## 2023-03-01 PROCEDURE — 80053 COMPREHEN METABOLIC PANEL: CPT

## 2023-03-01 PROCEDURE — 83036 HEMOGLOBIN GLYCOSYLATED A1C: CPT

## 2023-03-01 PROCEDURE — 1160F RVW MEDS BY RX/DR IN RCRD: CPT | Performed by: FAMILY MEDICINE

## 2023-03-01 PROCEDURE — 3052F HG A1C>EQUAL 8.0%<EQUAL 9.0%: CPT | Performed by: FAMILY MEDICINE

## 2023-03-01 PROCEDURE — 3079F DIAST BP 80-89 MM HG: CPT | Performed by: FAMILY MEDICINE

## 2023-03-01 PROCEDURE — 1159F MED LIST DOCD IN RCRD: CPT | Performed by: FAMILY MEDICINE

## 2023-03-01 PROCEDURE — 36415 COLL VENOUS BLD VENIPUNCTURE: CPT

## 2023-03-01 PROCEDURE — 99213 OFFICE O/P EST LOW 20 MIN: CPT | Performed by: FAMILY MEDICINE

## 2023-03-01 PROCEDURE — 3074F SYST BP LT 130 MM HG: CPT | Performed by: FAMILY MEDICINE

## 2023-03-01 PROCEDURE — 85025 COMPLETE CBC W/AUTO DIFF WBC: CPT

## 2023-03-01 NOTE — PROGRESS NOTES
Chief Complaint  Diabetes    Subjective    History of Present Illness {CC  Problem List  Visit  Diagnosis   Encounters  Notes  Medications  Labs  Result Review Imaging  Media :23}     Francis Whittaker presents to [facility] [place] for     Chief Complaint   Patient presents with   • Diabetes      Patient seen today for diabetes follow up.  Blood glucose levels have still been running a little high.  Has not noticed much difference with weight since taking the Ozempic.  Not always following a diabetic diet.  Still has times where he feels very hungry and feels like he cannot get enough food.         Current Outpatient Medications:   •  Accu-Chek Guide test strip, USE TO CHECK BLOOD SUGAR FOUR TIMES DAILY, Disp: 200 each, Rfl: 5  •  ASPIRIN 81 PO, Take  by mouth Daily., Disp: , Rfl:   •  atorvastatin (LIPITOR) 80 MG tablet, Take 1 tablet by mouth Every Night., Disp: 90 tablet, Rfl: 3  •  Blood Pressure Monitoring (Blood Pressure Cuff) misc, Use to check blood pressure once daily and as needed., Disp: 1 each, Rfl: 0  •  busPIRone (BUSPAR) 10 MG tablet, TAKE 1 TABLET BY MOUTH THREE TIMES DAILY, Disp: 90 tablet, Rfl: 3  •  chlorhexidine (PERIDEX) 0.12 % solution, , Disp: , Rfl:   •  docusate sodium (COLACE) 100 MG capsule, Take 1 capsule by mouth 2 (Two) Times a Day., Disp: , Rfl:   •  famotidine (PEPCID) 20 MG tablet, TAKE 1 TABLET BY MOUTH TWICE DAILY AS NEEDED FOR HEARTBURN, Disp: 60 tablet, Rfl: 2  •  Fexofenadine HCl (MUCINEX ALLERGY PO), Take 1 tablet by mouth Daily., Disp: , Rfl:   •  glucose monitor monitoring kit, Use to check blood glucose 4 times daily., Disp: 1 each, Rfl: 0  •  Insulin Pen Needle 31G X 6 MM misc, Use with insulin nightly, Disp: 100 each, Rfl: 3  •  L-THEANINE PO, Take 400 mg by mouth 2 (Two) Times a Day., Disp: , Rfl:   •  Lancets (freestyle) lancets, Use to check blood glucose 4 times daily, Disp: 100 each, Rfl: 5  •  Lantus SoloStar  "100 UNIT/ML injection pen, ADMINISTER 40 UNITS UNDER THE SKIN EVERY NIGHT AS DIRECTED, Disp: 12 mL, Rfl: 3  •  Loratadine (CLARITIN PO), Take  by mouth Daily., Disp: , Rfl:   •  MAGnesium-Oxide 400 (241.3 Mg) MG tablet tablet, Take 1 tablet by mouth every night at bedtime., Disp: , Rfl:   •  metoprolol tartrate (LOPRESSOR) 50 MG tablet, Take 1 tablet by mouth 3 (Three) Times a Day., Disp: , Rfl:   •  Ozempic, 1 MG/DOSE, 4 MG/3ML solution pen-injector, INJECT 1 MG UNDER THE SKIN ONCE A WEEK, Disp: 3 mL, Rfl: 3  •  sacubitril-valsartan (Entresto) 24-26 MG tablet, Take 1 tablet by mouth 2 (Two) Times a Day., Disp: , Rfl:   •  sertraline (Zoloft) 25 MG tablet, Take 1 tablet by mouth Daily., Disp: 30 tablet, Rfl: 5  •  verapamil SR (CALAN-SR) 120 MG CR tablet, Take  by mouth., Disp: , Rfl:      Objective       Vital Signs:   /80   Pulse 89   Ht 185.4 cm (73\")   Wt 104 kg (229 lb)   SpO2 97%   BMI 30.21 kg/m²     Physical Exam  Vitals reviewed.   Constitutional:       General: He is not in acute distress.     Appearance: He is well-developed.   Cardiovascular:      Rate and Rhythm: Normal rate and regular rhythm.      Heart sounds: No murmur heard.  Pulmonary:      Effort: Pulmonary effort is normal. No respiratory distress.      Breath sounds: Normal breath sounds. No wheezing.   Skin:     General: Skin is warm and dry.   Neurological:      Mental Status: He is alert and oriented to person, place, and time.        Result Review :{ Labs  Result Review  Imaging  Med Tab  Media :23}   The following data was reviewed by: Caryn Titus MD on 03/01/2023    Common labs    Common Labs 11/29/22 11/29/22 11/29/22    1140 1140 1140   Glucose   231 (A)   BUN   11   Creatinine   0.86   Sodium   137   Potassium   4.7   Chloride   101   Calcium   9.5   Albumin   4.40   Total Bilirubin   0.5   Alkaline Phosphatase   82   AST (SGOT)   49 (A)   ALT (SGPT)   84 (A)   WBC 10.75     Hemoglobin 15.0     Hematocrit 44.1   "   Platelets 266     Hemoglobin A1C  7.10 (A)    Microalbumin, Urine      (A) Abnormal value                    Assessment and Plan {CC Problem List  Visit Diagnosis  ROS  Review (Popup)  Health Maintenance  Quality  BestPractice  Medications  SmartSets  SnapShot Encounters  Media :23}   Diagnoses and all orders for this visit:    1. Type 2 diabetes mellitus with hyperglycemia, without long-term current use of insulin (HCC) (Primary)  -     Hemoglobin A1c; Future  -     CBC & Differential; Future  -     Comprehensive Metabolic Panel; Future       Patient seen today for follow up  Type 2 diabetes still not well controlled  Continue to work on diabetic diet  Work on eating small meals throughout the day, more frequently  Work on walking for exercise, gradual increase  Increase Lantus to 40 units at bedtime  Continue ozempic at current dose for now  Check labs as above      Follow Up {Instructions Charge Capture  Follow-up Communications :23}   Return in about 3 months (around 6/1/2023) for Recheck.  Patient was given instructions and counseling regarding his condition or for health maintenance advice. Please see specific information pulled into the AVS if appropriate.            This document has been electronically signed by Caryn Titus MD

## 2023-03-09 ENCOUNTER — TELEPHONE (OUTPATIENT)
Dept: FAMILY MEDICINE CLINIC | Facility: CLINIC | Age: 58
End: 2023-03-09
Payer: COMMERCIAL

## 2023-03-09 NOTE — PROGRESS NOTES
Per , Ms. Whittaker has been called with recent lab results & recommendations.  Continue current medications and follow-up as planned or sooner if any problems.     Dr. Titus, Mr Whittaker failed to talk with you about a place he has on the bottom of his foot.   He has a place on his right foot that is sore, possible callus, feels hard.   She wants to know what they should do.   I told her to watch the spot and if it gets red and irritated or increases in size to let us know.   She said it is sore when you push on it.  There is a red dot below where the callous looking place is.     I told her you would need to look at it before you can tell exactly what's going on.

## 2023-03-09 NOTE — TELEPHONE ENCOUNTER
Per , Ms. Whittaker has been called with recent lab results & recommendations.  Continue current medications and follow-up as planned or sooner if any problems.     Dr. Titus, Mr Whittaker failed to talk with you about a place he has on the bottom of his foot.   He has a place on his right foot that is sore, possible callus, feels hard.   She wants to know what they should do.   I told her to watch the spot and if it gets red and irritated or increases in size to let us know.   She said it is sore when you push on it.  There is a red dot below where the callous looking place is.     I told her you would need to look at it before you can tell exactly what's going on.       ----- Message from Caryn Titus MD sent at 3/7/2023  3:48 PM CST -----  HgbA1c too high, 8.9%.  Work on diet and exercise.  Changes as per last office visit.  - MARIELA Titus

## 2023-03-22 NOTE — TELEPHONE ENCOUNTER
Incoming Refill Request  {Tip  DIRECTIONS Type Rx details below. Pend Rx from patient's med list if dosage and other details match request. Jump to Medication Section:2  Medication requested (name and dose): verapamil SR (CALAN-SR) 120 MG CR tablet     Pharmacy where request should be sent: [facility] [place], [place]    Additional details provided by patient: Pt. Is out    Best call back number: [phone]    Does the patient have less than a 3 day supply:  [x] Yes  [] No    Pham Ferreira, Meganed Rep  03/22/23, 10:47 CDT

## 2023-03-22 NOTE — TELEPHONE ENCOUNTER
Comes from [facility] doctor.  Please ask they contact this provider for refills.  Thanks, MARIELA Titus

## 2023-03-27 NOTE — PROGRESS NOTES
Per Dr. Titus, Mr. Whittaker has been called with recommendations about his foot.  Ms Whittaker states she feels it is almost gone, she said he is not complaining about it any more.   Continue current medications and follow-up as planned or sooner if any problems.

## 2023-03-31 RX ORDER — BLOOD SUGAR DIAGNOSTIC
STRIP MISCELLANEOUS
Qty: 200 EACH | Refills: 5 | Status: SHIPPED | OUTPATIENT
Start: 2023-03-31

## 2023-04-15 DIAGNOSIS — E11.65 TYPE 2 DIABETES MELLITUS WITH HYPERGLYCEMIA, WITHOUT LONG-TERM CURRENT USE OF INSULIN: ICD-10-CM

## 2023-04-17 RX ORDER — SEMAGLUTIDE 1.34 MG/ML
INJECTION, SOLUTION SUBCUTANEOUS
Qty: 3 ML | Refills: 2 | Status: SHIPPED | OUTPATIENT
Start: 2023-04-17

## 2023-04-18 DIAGNOSIS — K21.9 GASTROESOPHAGEAL REFLUX DISEASE, UNSPECIFIED WHETHER ESOPHAGITIS PRESENT: ICD-10-CM

## 2023-04-18 RX ORDER — FAMOTIDINE 20 MG/1
TABLET, FILM COATED ORAL
Qty: 60 TABLET | Refills: 2 | Status: SHIPPED | OUTPATIENT
Start: 2023-04-18

## 2023-05-08 RX ORDER — ATORVASTATIN CALCIUM 80 MG/1
80 TABLET, FILM COATED ORAL NIGHTLY
Qty: 90 TABLET | Refills: 3 | Status: SHIPPED | OUTPATIENT
Start: 2023-05-08

## 2023-06-07 ENCOUNTER — TELEPHONE (OUTPATIENT)
Dept: FAMILY MEDICINE CLINIC | Facility: CLINIC | Age: 58
End: 2023-06-07
Payer: COMMERCIAL

## 2023-06-07 NOTE — TELEPHONE ENCOUNTER
Ms. Whittaker called in for Mr. Whittaker.  Dr. Titus was supposed to have some orders put in for Mr. Whittaker to have some lab work done.  He was supposed to have it done when he was here on 6/5/23 but he had a spell and she didn't think he needed to have it done.  Dr. Titus said that they could do it in [place] where they live but no orders were put in.    Needs to know if they are fasting or non-fasting labs.  He would like to go this morning.    Please call back at [phone]

## 2023-06-08 ENCOUNTER — LAB (OUTPATIENT)
Dept: LAB | Facility: OTHER | Age: 58
End: 2023-06-08
Payer: COMMERCIAL

## 2023-06-08 DIAGNOSIS — K21.9 GASTROESOPHAGEAL REFLUX DISEASE, UNSPECIFIED WHETHER ESOPHAGITIS PRESENT: ICD-10-CM

## 2023-06-08 DIAGNOSIS — Z79.4 TYPE 2 DIABETES MELLITUS WITH HYPERGLYCEMIA, WITH LONG-TERM CURRENT USE OF INSULIN: ICD-10-CM

## 2023-06-08 DIAGNOSIS — E11.65 TYPE 2 DIABETES MELLITUS WITH HYPERGLYCEMIA, WITH LONG-TERM CURRENT USE OF INSULIN: ICD-10-CM

## 2023-06-08 LAB
ALBUMIN SERPL-MCNC: 4.3 G/DL (ref 3.5–5)
ALBUMIN/GLOB SERPL: 1.3 G/DL (ref 1.1–1.8)
ALP SERPL-CCNC: 61 U/L (ref 38–126)
ALT SERPL W P-5'-P-CCNC: 53 U/L
ANION GAP SERPL CALCULATED.3IONS-SCNC: 10 MMOL/L (ref 5–15)
AST SERPL-CCNC: 34 U/L (ref 17–59)
BASOPHILS # BLD AUTO: 0.06 10*3/MM3 (ref 0–0.2)
BASOPHILS NFR BLD AUTO: 0.7 % (ref 0–1.5)
BILIRUB SERPL-MCNC: 0.7 MG/DL (ref 0.2–1.3)
BUN SERPL-MCNC: 23 MG/DL (ref 7–23)
BUN/CREAT SERPL: 24 (ref 7–25)
CALCIUM SPEC-SCNC: 9.7 MG/DL (ref 8.4–10.2)
CHLORIDE SERPL-SCNC: 105 MMOL/L (ref 101–112)
CHOLEST SERPL-MCNC: 81 MG/DL (ref 150–200)
CO2 SERPL-SCNC: 28 MMOL/L (ref 22–30)
CREAT SERPL-MCNC: 0.96 MG/DL (ref 0.7–1.3)
DEPRECATED RDW RBC AUTO: 41.7 FL (ref 37–54)
EGFRCR SERPLBLD CKD-EPI 2021: 92.2 ML/MIN/1.73
EOSINOPHIL # BLD AUTO: 0.25 10*3/MM3 (ref 0–0.4)
EOSINOPHIL NFR BLD AUTO: 2.8 % (ref 0.3–6.2)
ERYTHROCYTE [DISTWIDTH] IN BLOOD BY AUTOMATED COUNT: 12.7 % (ref 12.3–15.4)
GLOBULIN UR ELPH-MCNC: 3.4 GM/DL (ref 2.3–3.5)
GLUCOSE SERPL-MCNC: 211 MG/DL (ref 70–99)
HCT VFR BLD AUTO: 45.2 % (ref 37.5–51)
HDLC SERPL-MCNC: 21 MG/DL (ref 40–59)
HGB BLD-MCNC: 15.2 G/DL (ref 13–17.7)
LDLC SERPL CALC-MCNC: 30 MG/DL
LDLC/HDLC SERPL: 1.13 {RATIO} (ref 0–3.55)
LYMPHOCYTES # BLD AUTO: 3.04 10*3/MM3 (ref 0.7–3.1)
LYMPHOCYTES NFR BLD AUTO: 34.3 % (ref 19.6–45.3)
MCH RBC QN AUTO: 30.9 PG (ref 26.6–33)
MCHC RBC AUTO-ENTMCNC: 33.6 G/DL (ref 31.5–35.7)
MCV RBC AUTO: 91.9 FL (ref 79–97)
MONOCYTES # BLD AUTO: 0.74 10*3/MM3 (ref 0.1–0.9)
MONOCYTES NFR BLD AUTO: 8.4 % (ref 5–12)
NEUTROPHILS NFR BLD AUTO: 4.77 10*3/MM3 (ref 1.7–7)
NEUTROPHILS NFR BLD AUTO: 53.8 % (ref 42.7–76)
PLATELET # BLD AUTO: 217 10*3/MM3 (ref 140–450)
PMV BLD AUTO: 9.6 FL (ref 6–12)
POTASSIUM SERPL-SCNC: 4.6 MMOL/L (ref 3.4–5)
PROT SERPL-MCNC: 7.7 G/DL (ref 6.3–8.6)
RBC # BLD AUTO: 4.92 10*6/MM3 (ref 4.14–5.8)
SODIUM SERPL-SCNC: 143 MMOL/L (ref 137–145)
TRIGL SERPL-MCNC: 181 MG/DL
VLDLC SERPL-MCNC: 30 MG/DL (ref 5–40)
WBC NRBC COR # BLD: 8.86 10*3/MM3 (ref 3.4–10.8)

## 2023-06-08 PROCEDURE — 80061 LIPID PANEL: CPT | Performed by: FAMILY MEDICINE

## 2023-06-08 PROCEDURE — 80053 COMPREHEN METABOLIC PANEL: CPT | Performed by: FAMILY MEDICINE

## 2023-06-08 PROCEDURE — 83036 HEMOGLOBIN GLYCOSYLATED A1C: CPT | Performed by: FAMILY MEDICINE

## 2023-06-08 PROCEDURE — 85025 COMPLETE CBC W/AUTO DIFF WBC: CPT | Performed by: FAMILY MEDICINE

## 2023-06-09 LAB — HBA1C MFR BLD: 8.5 % (ref 4.8–5.6)

## 2023-06-13 ENCOUNTER — TELEPHONE (OUTPATIENT)
Dept: FAMILY MEDICINE CLINIC | Facility: CLINIC | Age: 58
End: 2023-06-13
Payer: COMMERCIAL

## 2023-06-13 NOTE — TELEPHONE ENCOUNTER
Per Dr. Titus, Ms. Whittaker has been called with recent lab results & recommendations.  Continue current medications and follow-up as planned or sooner if any problems.     ----- Message from Caryn Titus MD sent at 6/13/2023  3:40 PM CDT -----  Slight improvement in HgbA1c, but still elevated.  Continue working on healthy diet, eating regularly during the summer months and close home monitoring.  Thanks, MARIELA Titus

## 2023-07-31 RX ORDER — LANCETS 28 GAUGE
EACH MISCELLANEOUS
Qty: 100 EACH | Refills: 5 | Status: SHIPPED | OUTPATIENT
Start: 2023-07-31

## 2023-08-12 NOTE — TELEPHONE ENCOUNTER
Pt's wife called to let you know that you yair be receiving something from [facility] in [place] regarding Francis'sBD Pen Needle Tosin 2nd Gen 32G X 4 MM misc. He is having trouble with it not working and has to use another one and then running short of them. She said the pharmacy said that you may try putting down that he test 5 x's a day to give him extra when that happens.  
- - -

## 2023-08-21 DIAGNOSIS — K21.9 GASTROESOPHAGEAL REFLUX DISEASE, UNSPECIFIED WHETHER ESOPHAGITIS PRESENT: ICD-10-CM

## 2023-08-22 RX ORDER — FAMOTIDINE 20 MG/1
TABLET, FILM COATED ORAL
Qty: 60 TABLET | Refills: 2 | OUTPATIENT
Start: 2023-08-22

## 2023-09-06 ENCOUNTER — OFFICE VISIT (OUTPATIENT)
Dept: FAMILY MEDICINE CLINIC | Facility: CLINIC | Age: 58
End: 2023-09-06
Payer: COMMERCIAL

## 2023-09-06 VITALS
WEIGHT: 225 LBS | BODY MASS INDEX: 29.82 KG/M2 | OXYGEN SATURATION: 96 % | SYSTOLIC BLOOD PRESSURE: 110 MMHG | HEIGHT: 73 IN | HEART RATE: 86 BPM | DIASTOLIC BLOOD PRESSURE: 80 MMHG

## 2023-09-06 DIAGNOSIS — F41.9 ANXIETY: ICD-10-CM

## 2023-09-06 DIAGNOSIS — E11.65 TYPE 2 DIABETES MELLITUS WITH HYPERGLYCEMIA, WITHOUT LONG-TERM CURRENT USE OF INSULIN: Primary | ICD-10-CM

## 2023-09-06 DIAGNOSIS — Z95.3 STATUS POST AORTIC VALVE REPLACEMENT WITH PORCINE VALVE: ICD-10-CM

## 2023-09-06 PROCEDURE — 3079F DIAST BP 80-89 MM HG: CPT | Performed by: FAMILY MEDICINE

## 2023-09-06 PROCEDURE — 3044F HG A1C LEVEL LT 7.0%: CPT | Performed by: FAMILY MEDICINE

## 2023-09-06 PROCEDURE — 3074F SYST BP LT 130 MM HG: CPT | Performed by: FAMILY MEDICINE

## 2023-09-06 PROCEDURE — 99214 OFFICE O/P EST MOD 30 MIN: CPT | Performed by: FAMILY MEDICINE

## 2023-09-13 ENCOUNTER — TELEPHONE (OUTPATIENT)
Dept: FAMILY MEDICINE CLINIC | Facility: CLINIC | Age: 58
End: 2023-09-13
Payer: COMMERCIAL

## 2023-09-13 ENCOUNTER — LAB (OUTPATIENT)
Dept: LAB | Facility: OTHER | Age: 58
End: 2023-09-13
Payer: COMMERCIAL

## 2023-09-13 DIAGNOSIS — E11.65 TYPE 2 DIABETES MELLITUS WITH HYPERGLYCEMIA, WITHOUT LONG-TERM CURRENT USE OF INSULIN: ICD-10-CM

## 2023-09-13 LAB
ALBUMIN SERPL-MCNC: 4.2 G/DL (ref 3.5–5)
ALBUMIN/GLOB SERPL: 1.4 G/DL (ref 1.1–1.8)
ALP SERPL-CCNC: 48 U/L (ref 38–126)
ALT SERPL W P-5'-P-CCNC: 34 U/L
ANION GAP SERPL CALCULATED.3IONS-SCNC: 8 MMOL/L (ref 5–15)
AST SERPL-CCNC: 27 U/L (ref 17–59)
BASOPHILS # BLD AUTO: 0.05 10*3/MM3 (ref 0–0.2)
BASOPHILS NFR BLD AUTO: 0.6 % (ref 0–1.5)
BILIRUB SERPL-MCNC: 0.8 MG/DL (ref 0–1.2)
BUN SERPL-MCNC: 12 MG/DL (ref 7–23)
BUN/CREAT SERPL: 16 (ref 7–25)
CALCIUM SPEC-SCNC: 8.6 MG/DL (ref 8.4–10.2)
CHLORIDE SERPL-SCNC: 111 MMOL/L (ref 101–112)
CO2 SERPL-SCNC: 24 MMOL/L (ref 22–30)
CREAT SERPL-MCNC: 0.75 MG/DL (ref 0.7–1.3)
DEPRECATED RDW RBC AUTO: 43.9 FL (ref 37–54)
EGFRCR SERPLBLD CKD-EPI 2021: 105.3 ML/MIN/1.73
EOSINOPHIL # BLD AUTO: 0.22 10*3/MM3 (ref 0–0.4)
EOSINOPHIL NFR BLD AUTO: 2.7 % (ref 0.3–6.2)
ERYTHROCYTE [DISTWIDTH] IN BLOOD BY AUTOMATED COUNT: 13.3 % (ref 12.3–15.4)
GLOBULIN UR ELPH-MCNC: 3 GM/DL (ref 2.3–3.5)
GLUCOSE SERPL-MCNC: 112 MG/DL (ref 70–99)
HBA1C MFR BLD: 6.5 % (ref 4.8–5.6)
HCT VFR BLD AUTO: 42 % (ref 37.5–51)
HGB BLD-MCNC: 14.2 G/DL (ref 13–17.7)
LYMPHOCYTES # BLD AUTO: 2.96 10*3/MM3 (ref 0.7–3.1)
LYMPHOCYTES NFR BLD AUTO: 35.7 % (ref 19.6–45.3)
MCH RBC QN AUTO: 31.3 PG (ref 26.6–33)
MCHC RBC AUTO-ENTMCNC: 33.8 G/DL (ref 31.5–35.7)
MCV RBC AUTO: 92.7 FL (ref 79–97)
MONOCYTES # BLD AUTO: 0.64 10*3/MM3 (ref 0.1–0.9)
MONOCYTES NFR BLD AUTO: 7.7 % (ref 5–12)
NEUTROPHILS NFR BLD AUTO: 4.43 10*3/MM3 (ref 1.7–7)
NEUTROPHILS NFR BLD AUTO: 53.3 % (ref 42.7–76)
PLATELET # BLD AUTO: 179 10*3/MM3 (ref 140–450)
PMV BLD AUTO: 9.8 FL (ref 6–12)
POTASSIUM SERPL-SCNC: 4 MMOL/L (ref 3.4–5)
PROT SERPL-MCNC: 7.2 G/DL (ref 6.3–8.6)
RBC # BLD AUTO: 4.53 10*6/MM3 (ref 4.14–5.8)
SODIUM SERPL-SCNC: 143 MMOL/L (ref 137–145)
WBC NRBC COR # BLD: 8.3 10*3/MM3 (ref 3.4–10.8)

## 2023-09-13 PROCEDURE — 80053 COMPREHEN METABOLIC PANEL: CPT | Performed by: FAMILY MEDICINE

## 2023-09-13 PROCEDURE — 83036 HEMOGLOBIN GLYCOSYLATED A1C: CPT | Performed by: FAMILY MEDICINE

## 2023-09-13 PROCEDURE — 85025 COMPLETE CBC W/AUTO DIFF WBC: CPT | Performed by: FAMILY MEDICINE

## 2023-09-13 NOTE — TELEPHONE ENCOUNTER
-Per Dr. Titus, Ms. Whittaker has been called with recent lab results & recommendations.  Continue current medications and follow-up as planned or sooner if any problems.     ---- Message from Caryn Titus MD sent at 9/13/2023  8:08 AM CDT -----  Labs all ok.  Thanks, MARIELA Titus

## 2023-09-15 ENCOUNTER — TELEPHONE (OUTPATIENT)
Dept: FAMILY MEDICINE CLINIC | Facility: CLINIC | Age: 58
End: 2023-09-15
Payer: COMMERCIAL

## 2023-09-15 NOTE — TELEPHONE ENCOUNTER
-Per Dr. Titus, Ms. Whittaker has been called with recent lab results & recommendations.  Continue current medications and follow-up as planned or sooner if any problems.     ---- Message from Caryn Titus MD sent at 9/14/2023 12:59 PM CDT -----  HgbA1c improved at 6.5% - which is great! Thanks, MARIELA Titus

## 2023-09-29 ENCOUNTER — HOSPITAL ENCOUNTER (EMERGENCY)
Facility: HOSPITAL | Age: 58
Discharge: HOME | End: 2023-09-29
Attending: FAMILY MEDICINE | Admitting: FAMILY MEDICINE
Payer: COMMERCIAL

## 2023-09-29 ENCOUNTER — APPOINTMENT (OUTPATIENT)
Dept: GENERAL RADIOLOGY | Facility: HOSPITAL | Age: 58
End: 2023-09-29
Payer: COMMERCIAL

## 2023-09-29 ENCOUNTER — APPOINTMENT (OUTPATIENT)
Dept: CT IMAGING | Facility: HOSPITAL | Age: 58
End: 2023-09-29
Payer: COMMERCIAL

## 2023-09-29 VITALS
RESPIRATION RATE: 18 BRPM | OXYGEN SATURATION: 98 % | HEART RATE: 100 BPM | WEIGHT: 225 LBS | SYSTOLIC BLOOD PRESSURE: 130 MMHG | DIASTOLIC BLOOD PRESSURE: 88 MMHG | BODY MASS INDEX: 29.82 KG/M2 | TEMPERATURE: 98.2 F | HEIGHT: 73 IN
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
## 2023-09-29 PROCEDURE — 71046 X-RAY EXAM CHEST 2 VIEWS: CPT

## 2023-09-29 PROCEDURE — 70450 CT HEAD/BRAIN W/O DYE: CPT

## 2023-09-29 PROCEDURE — 99284 EMERGENCY DEPT VISIT MOD MDM: CPT

## 2023-09-29 PROCEDURE — 72125 CT NECK SPINE W/O DYE: CPT
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
## 2024-06-28 NOTE — TELEPHONE ENCOUNTER
Problem: Discharge Planning  Goal: Discharge to home or other facility with appropriate resources  6/27/2024 2048 by Mansi Luna RN  Outcome: Progressing  Flowsheets (Taken 6/27/2024 2030)  Discharge to home or other facility with appropriate resources:   Identify barriers to discharge with patient and caregiver   Refer to discharge planning if patient needs post-hospital services based on physician order or complex needs related to functional status, cognitive ability or social support system  6/27/2024 1643 by Reina Graff RN  Outcome: Not Progressing     Problem: Pain  Goal: Verbalizes/displays adequate comfort level or baseline comfort level  6/27/2024 2048 by Mansi Luna RN  Outcome: Progressing  6/27/2024 1643 by Reina Graff RN  Outcome: Not Progressing     Problem: ABCDS Injury Assessment  Goal: Absence of physical injury  6/27/2024 2048 by Mansi Luna RN  Outcome: Progressing  6/27/2024 1643 by Reina Graff RN  Outcome: Not Progressing     Problem: Safety - Adult  Goal: Free from fall injury  6/27/2024 2048 by Mansi Luna RN  Outcome: Progressing  6/27/2024 1643 by Reina Graff RN  Outcome: Not Progressing     Problem: Gastrointestinal - Adult  Goal: Minimal or absence of nausea and vomiting  6/27/2024 2048 by Mansi Luna RN  Outcome: Progressing  Flowsheets (Taken 6/27/2024 2030)  Minimal or absence of nausea and vomiting:   Administer IV fluids as ordered to ensure adequate hydration   Maintain NPO status until nausea and vomiting are resolved   Administer ordered antiemetic medications as needed   Provide nonpharmacologic comfort measures as appropriate  6/27/2024 1643 by Reina Graff RN  Outcome: Not Progressing  Goal: Maintains or returns to baseline bowel function  6/27/2024 2048 by Mansi Luna RN  Outcome: Progressing  Flowsheets (Taken 6/27/2024 2030)  Maintains or returns to baseline bowel function:   Assess bowel function   Administer IV fluids as ordered  Pt is calling in his sugar levels   Jan 13 th   Am fast- 201  Fast lunch-236    Jan 14   Fast am 243  Fast before dinner 194    Jesus 15   fast am 201  Before bed 292 (  2 hours after eating maybe closer to 4- 5 hrs after dinner)    Jan 16   Fast am 178  Fast lunch 276    Jan 17   Fast am 230  Before bed 211 ( He eats around 5 or 6 pm and goes bed around 10 pm     Jan 18   Fast am 233  Bedtime 309    Jan 19   Fast am 243  Fast lunch 198    Jan 20   Fast am 176  Dinner fast 119    Jan 21   fast am 224  Bedtime 236    Jan 22   Fast am 170  Fast lunch 204    Jan 23 fast am 224  Fast dinner 139    Jan 24   Fast am 212  Fast bed 336    Jan 25   Fast am 233  Fast dinner 215    Jan 26   Fast am 230  Fast bed 360    Pt gets hungry now that over surgery and is snacking about every 2 hours thinks he is stress eating according to wife     Insurance wants to also change the trulicity to ozempic and wife is concerned about side effects   Can reach [phone]   document dressing/incision, wound bed, drain sites and surrounding tissue  6/27/2024 1643 by Reina Graff RN  Outcome: Not Progressing  Goal: Oral mucous membranes remain intact  6/27/2024 2048 by Mansi Luna RN  Outcome: Progressing  Flowsheets (Taken 6/27/2024 2030)  Oral Mucous Membranes Remain Intact: Assess oral mucosa and hygiene practices  6/27/2024 1643 by Reina Graff RN  Outcome: Not Progressing     Problem: Musculoskeletal - Adult  Goal: Return mobility to safest level of function  6/27/2024 2048 by Mansi Luna RN  Outcome: Progressing  Flowsheets (Taken 6/27/2024 2030)  Return Mobility to Safest Level of Function:   Assess patient stability and activity tolerance for standing, transferring and ambulating with or without assistive devices   Assist with transfers and ambulation using safe patient handling equipment as needed   Obtain physical therapy/occupational therapy consults as needed  6/27/2024 1643 by Reina Graff RN  Outcome: Not Progressing  Goal: Maintain proper alignment of affected body part  6/27/2024 2048 by Mansi Luna RN  Outcome: Progressing  Flowsheets (Taken 6/27/2024 2030)  Maintain proper alignment of affected body part: Instruct and reinforce with patient and family use of appropriate assistive device and precautions (e.g. spinal or hip dislocation precautions)  6/27/2024 1643 by Reina Graff RN  Outcome: Not Progressing  Goal: Return ADL status to a safe level of function  6/27/2024 2048 by Mansi Luna RN  Outcome: Progressing  Flowsheets (Taken 6/27/2024 2030)  Return ADL Status to a Safe Level of Function:   Administer medication as ordered   Assess activities of daily living deficits and provide assistive devices as needed   Obtain physical therapy/occupational therapy consults as needed  6/27/2024 1643 by Reina Graff RN  Outcome: Not Progressing     Problem: Genitourinary - Adult  Goal: Absence of urinary retention  6/27/2024 2048 by Jeremy

## 2024-10-02 NOTE — PROGRESS NOTES
"Follow Up Office Visit          Francis Whittaker is a 55 y.o. male that presents today for   Chief Complaint   Patient presents with   • Diabetes     3 month recheck       HPI    Diabetes  Patient presents today for 3 month recheck diabetes mellitus.  Says that glucose is running high.  Having difficulties sticking to 60 carbs per day.  Blood glucose ranges are below:  Fasting 187 - 229  Before lunch 168 -249  Before dinner 102-162  Bedtime 220s    Anxiety  Doing well with buspar.  Still has occasional anxiety symptoms/panic attack, mostly when out in public.  Patient's wife notes she can tell a difference with medication.    Aortic stenosis  Had follow up with cardiology.  Valve stenosis is getting slightly worse.  He was advised that he will likely need surgical intervention.  Patient scheduled for follow up with this provider ([facility] in [place], [place]) in 6 months or sooner with any symptoms of fatigue, shortness of breath or chest pain.       Review of Systems   Constitutional: Negative for chills and fatigue.   HENT: Negative for congestion and sore throat.    Respiratory: Negative for cough and shortness of breath.    Cardiovascular: Negative for chest pain and leg swelling.   Gastrointestinal: Negative for abdominal pain.   Skin: Negative for rash.   Psychiatric/Behavioral: Negative for sleep disturbance. The patient is nervous/anxious (at times).            Vitals:    01/04/21 1056   BP: 122/88   Pulse: 98   SpO2: 98%   Weight: 104 kg (230 lb)   Height: 188 cm (74\")     Body mass index is 29.53 kg/m².    Physical Exam  Vitals signs reviewed.   Constitutional:       General: He is not in acute distress.     Appearance: He is well-developed.   HENT:      Head: Normocephalic and atraumatic.   Neck:      Musculoskeletal: Neck supple.   Cardiovascular:      Rate and Rhythm: Normal rate and regular rhythm.      Heart sounds: Murmur present. Systolic murmur present with a grade of 3/6.   Pulmonary:      Effort: " Pulmonary effort is normal. No respiratory distress.      Breath sounds: Normal breath sounds. No wheezing.   Abdominal:      Palpations: Abdomen is soft.      Tenderness: There is no abdominal tenderness.   Skin:     General: Skin is warm and dry.      Findings: No rash.   Neurological:      Mental Status: He is alert and oriented to person, place, and time.         Assessment/Plan   Diagnoses and all orders for this visit:    1. Type 2 diabetes mellitus with hyperglycemia, without long-term current use of insulin (CMS/[facility]) (Primary)  Not well controlled  Increase Humalog, Breakfast 7 units, Lunch 7 units and Dinner 10 units  Increase Basaglar to 28 units at bedtime  Patient encouraged to start taking metformin, start low at 250 mg in the morning as patient has some concerns about side effects  Will check HgbA1c, BMP and Microalbumin/Cr ration  -     Insulin Glargine (BASAGLAR KWIKPEN) 100 UNIT/ML injection pen; Inject 26 Units under the skin into the appropriate area as directed Every Night for 90 days.  Dispense: 3 pen; Refill: 2  -     Hemoglobin A1c; Future  -     Basic metabolic panel; Future  -     Microalbumin / Creatinine Urine Ratio - Urine, Clean Catch; Future    2. Anxiety  Improved, but still present  Worse in social situations  Will increase Buspar to 7.5 mg three times daily  -     busPIRone (BUSPAR) 7.5 MG tablet; Take 1 tablet by mouth 3 (Three) Times a Day.  Dispense: 30 tablet; Refill: 2    3. Severe aortic stenosis  Following with cardiology  Patient is asymptomatic at this time  He will monitor closely, and call with concerns      Return in about 4 weeks (around 2/1/2021) for Recheck.          This document has been electronically signed by Caryn Titus MD on January 4, 2021 11:13 CST       Yes